# Patient Record
Sex: FEMALE | Race: WHITE | Employment: FULL TIME | URBAN - METROPOLITAN AREA
[De-identification: names, ages, dates, MRNs, and addresses within clinical notes are randomized per-mention and may not be internally consistent; named-entity substitution may affect disease eponyms.]

---

## 2017-06-28 LAB
CHLAMYDIA, EXTERNAL: NEGATIVE
HBSAG, EXTERNAL: NEGATIVE
HIV, EXTERNAL: NONREACTIVE
N. GONORRHEA, EXTERNAL: NEGATIVE
RPR, EXTERNAL: NONREACTIVE
RUBELLA, EXTERNAL: NORMAL
TYPE, ABO & RH, EXTERNAL: NORMAL

## 2018-01-08 LAB — GRBS, EXTERNAL: NEGATIVE

## 2018-02-09 ENCOUNTER — HOSPITAL ENCOUNTER (INPATIENT)
Age: 28
LOS: 1 days | Discharge: HOME | End: 2018-02-10
Attending: OBSTETRICS & GYNECOLOGY | Admitting: OBSTETRICS & GYNECOLOGY
Payer: COMMERCIAL

## 2018-02-09 PROBLEM — Z34.90 PREGNANCY: Status: ACTIVE | Noted: 2018-02-09

## 2018-02-09 LAB
ABO + RH BLD: NORMAL
BASOPHILS # BLD: 0 K/UL (ref 0–0.06)
BASOPHILS NFR BLD: 0 % (ref 0–2)
BLOOD GROUP ANTIBODIES SERPL: NORMAL
DIFFERENTIAL METHOD BLD: ABNORMAL
EOSINOPHIL # BLD: 0.1 K/UL (ref 0–0.4)
EOSINOPHIL NFR BLD: 0 % (ref 0–5)
ERYTHROCYTE [DISTWIDTH] IN BLOOD BY AUTOMATED COUNT: 13.2 % (ref 11.6–14.5)
HCT VFR BLD AUTO: 36.9 % (ref 35–45)
HGB BLD-MCNC: 12.4 G/DL (ref 12–16)
LYMPHOCYTES # BLD: 2.8 K/UL (ref 0.9–3.6)
LYMPHOCYTES NFR BLD: 19 % (ref 21–52)
MCH RBC QN AUTO: 29.5 PG (ref 24–34)
MCHC RBC AUTO-ENTMCNC: 33.6 G/DL (ref 31–37)
MCV RBC AUTO: 87.9 FL (ref 74–97)
MONOCYTES # BLD: 0.6 K/UL (ref 0.05–1.2)
MONOCYTES NFR BLD: 4 % (ref 3–10)
NEUTS SEG # BLD: 11.5 K/UL (ref 1.8–8)
NEUTS SEG NFR BLD: 77 % (ref 40–73)
PLATELET # BLD AUTO: 250 K/UL (ref 135–420)
PMV BLD AUTO: 10.6 FL (ref 9.2–11.8)
RBC # BLD AUTO: 4.2 M/UL (ref 4.2–5.3)
SPECIMEN EXP DATE BLD: NORMAL
WBC # BLD AUTO: 15.1 K/UL (ref 4.6–13.2)

## 2018-02-09 PROCEDURE — 74011250637 HC RX REV CODE- 250/637: Performed by: ADVANCED PRACTICE MIDWIFE

## 2018-02-09 PROCEDURE — 86900 BLOOD TYPING SEROLOGIC ABO: CPT | Performed by: ADVANCED PRACTICE MIDWIFE

## 2018-02-09 PROCEDURE — 75410000002 HC LABOR FEE PER 1 HR

## 2018-02-09 PROCEDURE — 75410000003 HC RECOV DEL/VAG/CSECN EA 0.5 HR

## 2018-02-09 PROCEDURE — 59025 FETAL NON-STRESS TEST: CPT

## 2018-02-09 PROCEDURE — 0HQ9XZZ REPAIR PERINEUM SKIN, EXTERNAL APPROACH: ICD-10-PCS | Performed by: ADVANCED PRACTICE MIDWIFE

## 2018-02-09 PROCEDURE — 65270000029 HC RM PRIVATE

## 2018-02-09 PROCEDURE — 85025 COMPLETE CBC W/AUTO DIFF WBC: CPT | Performed by: ADVANCED PRACTICE MIDWIFE

## 2018-02-09 PROCEDURE — 99281 EMR DPT VST MAYX REQ PHY/QHP: CPT

## 2018-02-09 PROCEDURE — 74011250636 HC RX REV CODE- 250/636: Performed by: ADVANCED PRACTICE MIDWIFE

## 2018-02-09 PROCEDURE — 75410000000 HC DELIVERY VAGINAL/SINGLE

## 2018-02-09 RX ORDER — SODIUM CHLORIDE, SODIUM LACTATE, POTASSIUM CHLORIDE, CALCIUM CHLORIDE 600; 310; 30; 20 MG/100ML; MG/100ML; MG/100ML; MG/100ML
125 INJECTION, SOLUTION INTRAVENOUS CONTINUOUS
Status: DISCONTINUED | OUTPATIENT
Start: 2018-02-09 | End: 2018-02-09 | Stop reason: HOSPADM

## 2018-02-09 RX ORDER — LIDOCAINE HYDROCHLORIDE 10 MG/ML
20 INJECTION, SOLUTION EPIDURAL; INFILTRATION; INTRACAUDAL; PERINEURAL AS NEEDED
Status: DISCONTINUED | OUTPATIENT
Start: 2018-02-09 | End: 2018-02-09 | Stop reason: HOSPADM

## 2018-02-09 RX ORDER — OXYCODONE AND ACETAMINOPHEN 5; 325 MG/1; MG/1
2 TABLET ORAL
Status: DISCONTINUED | OUTPATIENT
Start: 2018-02-09 | End: 2018-02-10 | Stop reason: HOSPADM

## 2018-02-09 RX ORDER — LIDOCAINE HYDROCHLORIDE 10 MG/ML
INJECTION INFILTRATION; PERINEURAL
Status: DISPENSED
Start: 2018-02-09 | End: 2018-02-09

## 2018-02-09 RX ORDER — IBUPROFEN 400 MG/1
800 TABLET ORAL
Status: DISCONTINUED | OUTPATIENT
Start: 2018-02-09 | End: 2018-02-10 | Stop reason: HOSPADM

## 2018-02-09 RX ORDER — AMOXICILLIN 250 MG
1 CAPSULE ORAL
Status: DISCONTINUED | OUTPATIENT
Start: 2018-02-09 | End: 2018-02-10 | Stop reason: HOSPADM

## 2018-02-09 RX ORDER — ACETAMINOPHEN 325 MG/1
650 TABLET ORAL
Status: DISCONTINUED | OUTPATIENT
Start: 2018-02-09 | End: 2018-02-10 | Stop reason: HOSPADM

## 2018-02-09 RX ORDER — TERBUTALINE SULFATE 1 MG/ML
0.25 INJECTION SUBCUTANEOUS
Status: DISCONTINUED | OUTPATIENT
Start: 2018-02-09 | End: 2018-02-09 | Stop reason: HOSPADM

## 2018-02-09 RX ORDER — OXYTOCIN/RINGER'S LACTATE 20/1000 ML
125 PLASTIC BAG, INJECTION (ML) INTRAVENOUS CONTINUOUS
Status: DISCONTINUED | OUTPATIENT
Start: 2018-02-09 | End: 2018-02-09 | Stop reason: HOSPADM

## 2018-02-09 RX ORDER — ZOLPIDEM TARTRATE 5 MG/1
5 TABLET ORAL
Status: DISCONTINUED | OUTPATIENT
Start: 2018-02-09 | End: 2018-02-10 | Stop reason: HOSPADM

## 2018-02-09 RX ORDER — MINERAL OIL
30 OIL (ML) ORAL AS NEEDED
Status: DISCONTINUED | OUTPATIENT
Start: 2018-02-09 | End: 2018-02-09 | Stop reason: HOSPADM

## 2018-02-09 RX ORDER — PROMETHAZINE HYDROCHLORIDE 25 MG/ML
25 INJECTION, SOLUTION INTRAMUSCULAR; INTRAVENOUS
Status: DISCONTINUED | OUTPATIENT
Start: 2018-02-09 | End: 2018-02-10 | Stop reason: HOSPADM

## 2018-02-09 RX ORDER — NALBUPHINE HYDROCHLORIDE 10 MG/ML
10 INJECTION, SOLUTION INTRAMUSCULAR; INTRAVENOUS; SUBCUTANEOUS
Status: DISCONTINUED | OUTPATIENT
Start: 2018-02-09 | End: 2018-02-09 | Stop reason: HOSPADM

## 2018-02-09 RX ORDER — ONDANSETRON 2 MG/ML
4 INJECTION INTRAMUSCULAR; INTRAVENOUS
Status: DISCONTINUED | OUTPATIENT
Start: 2018-02-09 | End: 2018-02-09 | Stop reason: HOSPADM

## 2018-02-09 RX ORDER — METHYLERGONOVINE MALEATE 0.2 MG/ML
0.2 INJECTION INTRAVENOUS AS NEEDED
Status: DISCONTINUED | OUTPATIENT
Start: 2018-02-09 | End: 2018-02-09 | Stop reason: HOSPADM

## 2018-02-09 RX ORDER — OXYTOCIN/RINGER'S LACTATE 20/1000 ML
500 PLASTIC BAG, INJECTION (ML) INTRAVENOUS ONCE
Status: COMPLETED | OUTPATIENT
Start: 2018-02-09 | End: 2018-02-09

## 2018-02-09 RX ORDER — HYDROMORPHONE HYDROCHLORIDE 2 MG/ML
1 INJECTION, SOLUTION INTRAMUSCULAR; INTRAVENOUS; SUBCUTANEOUS
Status: DISCONTINUED | OUTPATIENT
Start: 2018-02-09 | End: 2018-02-09 | Stop reason: HOSPADM

## 2018-02-09 RX ORDER — BUTORPHANOL TARTRATE 2 MG/ML
2 INJECTION INTRAMUSCULAR; INTRAVENOUS
Status: DISCONTINUED | OUTPATIENT
Start: 2018-02-09 | End: 2018-02-09 | Stop reason: HOSPADM

## 2018-02-09 RX ADMIN — IBUPROFEN 800 MG: 400 TABLET, FILM COATED ORAL at 16:12

## 2018-02-09 RX ADMIN — Medication 125 ML/HR: at 05:00

## 2018-02-09 RX ADMIN — Medication 10000 MILLI-UNITS/HR: at 04:26

## 2018-02-09 RX ADMIN — IBUPROFEN 800 MG: 400 TABLET, FILM COATED ORAL at 08:17

## 2018-02-09 RX ADMIN — Medication 125 ML/HR: at 06:21

## 2018-02-09 NOTE — IP AVS SNAPSHOT
Summary of Care Report The Summary of Care report has been created to help improve care coordination. Users with access to Signature or [address] (Web-based application) may access additional patient information including the Discharge Summary. If you are not currently a [address] user and need more information, please call the number listed below in the [place] [address] section and ask to be connected with Medical Records. Facility Information Name Address Phone [address] [address] [phone] Patient Information Patient Name Sex  Marie Gary ([id number]) Female 1990 Discharge Information Admitting Provider Service Area Unit Nathaniel Leong MD / [phone] 1 [address] / [phone] Discharge Provider Discharge Date/Time Discharge Disposition Destination (none) 2/10/2018 Afternoon (Pending) AHR (none) Patient Language Language ENGLISH [13] Hospital Problems as of 2/10/2018  Reviewed: 2018  5:00 AM by Lang Livingston CNM Class Noted - Resolved Last Modified POA Active Problems * (Principal)Vaginal delivery  2018 - Present 2018 by Lang Livingston CNM No  
  Entered by Lang Livingston CNM Pregnancy  2018 - Present 2018 by Nathaniel Leong MD Unknown Entered by Nathaniel Leong MD  
  
Non-Hospital Problems as of 2/10/2018  Reviewed: 2018  5:00 AM by Lang Livingston CNM None You are allergic to the following No active allergies Current Discharge Medication List  
  
START taking these medications Dose & Instructions Dispensing Information Comments  
 ibuprofen 800 mg tablet Commonly known as:  MOTRIN Dose:  800 mg Take 1 Tab by mouth every eight (8) hours as needed. Quantity:  90 Tab Refills:  0 CONTINUE these medications which have NOT CHANGED Dose & Instructions Dispensing Information Comments PRENATAL DHA+COMPLETE PRENATAL -300 mg-mcg-mg Cmpk Generic drug:  [id number] chapito-folic-dha  
 Dose:  1 Tab Take 1 Tab by mouth daily. Indications: pregnancy Refills:  0 Follow-up Information Follow up With Details Comments Contact Info None   None (395) Patient stated that they have no PCP Discharge Instructions After Your Delivery (the Postpartum Period): After Your Visit Your Care Instructions Congratulations on the birth of your baby. Like pregnancy, the  period can be a time of excitement, ludy, and exhaustion. You may look at your wondrous little baby and feel happy. You may also be overwhelmed by your new sleep hours and new responsibilities. At first, babies often sleep during the days and are awake at night. They do not have a pattern or routine. They may make sudden gasps, jerk themselves awake, or look like they have crossed eyes. These are all normal, and they may even make you smile. In these first weeks after delivery, try to take good care of yourself. It may take 4 to 6 weeks to feel like yourself again, and possibly longer if you had a  birth. You will likely feel very tired for several weeks. Your days will be full of ups and downs, but lots of ludy as well. Follow-up care is a key part of your treatment and safety. Be sure to make and go to all appointments, and call your doctor if you are having problems. Its also a good idea to know your test results and keep a list of the medicines you take. How can you care for yourself at home? Take care of your body after delivery · Use pads instead of tampons for the bloody flow that may last as long as 2 weeks. · Ease cramps with acetaminophen (Tylenol).  
· Ease soreness of hemorrhoids and the area between your vagina and rectum with ice compresses or witch hazel pads. · Ease constipation by drinking lots of fluid and eating high-fiber foods. Ask your doctor about over-the-counter stool softeners. · Cleanse yourself with a gentle squeeze of warm water from a bottle instead of wiping with toilet paper. · Take a sitz bath in warm water several times a day. · Wear a good nursing bra. Ease sore and swollen breasts with warm, wet washcloths. · If you are not breast-feeding, use ice rather than heat for breast soreness. · Your period may not start for several months if you are breast-feeding. You may bleed more, and longer at first, than you did before you got pregnant. · Wait until you are healed (about 4 to 6 weeks) before you have sexual intercourse. Your doctor will tell you when it is okay to have sex. · Try not to travel with your baby for 5 or 6 weeks. If you take a long car trip, make frequent stops to walk around and stretch. Avoid exhaustion · Rest every day. Try to nap when your baby naps. · Ask another adult to be with you for a few days after delivery. · Plan for  if you have other children. · Stay flexible so you can eat at odd hours and sleep when you need to. Both you and your baby are making new schedules. · Plan small trips to get out of the house. Change can make you feel less tired. · Ask for help with housework, cooking, and shopping. Remind yourself that your job is to care for your baby. Know about help for postpartum depression · \"Baby blues are common for the first 1 to 2 weeks after birth. You may cry or feel sad or irritable for no reason. · Rest whenever you can. Being tired makes it harder to handle your emotions. · Go for walks with your baby. · Talk to your partner, friends, and family about your feelings. · If your symptoms last for more than a few weeks, or if you feel very depressed, ask your doctor for help. · Postpartum depression can be treated. Support groups and counseling can help. Sometimes medicine can also help. Stay healthy · Eat healthy foods so you have more energy, make good breast milk, and lose extra baby pounds. · If you breast-feed, avoid alcohol and drugs. Stay smoke-free. If you quit during pregnancy, congratulations. · Start daily exercise after 4 to 6 weeks, but rest when you feel tired. · Learn exercises to tone your belly. Do Kegel exercises to regain strength in your pelvic muscles. You can do these exercises while you stand or sit. ¨ Squeeze the same muscles you would use to stop your urine. Your belly and rear end (buttocks) should not move. ¨ Hold the squeeze for 3 seconds, then relax for 3 seconds. ¨ Repeat the exercise 10 to 15 times for each session. Do three or more sessions each day. · Find a class for new mothers and new babies that has an exercise time. · If you had a  birth, give yourself a bit more time before you exercise, and be careful. When should you call for help? Call 911 anytime you think you may need emergency care. For example, call if: 
· You have sudden, severe pain in your belly. · You passed out (lost consciousness). Call your doctor now or seek immediate medical care if: 
· You have severe vaginal bleeding. You are passing blood clots and soaking through a pad each hour for 2 or more hours. · Your vaginal bleeding seems to be getting heavier or is still bright red 4 days after delivery, or you pass blood clots larger than the size of a golf ball. · You are dizzy or lightheaded, or you feel like you may faint. · You are vomiting or cannot keep fluids down. · You have a fever. · You have new or more belly pain. · You pass tissue (not just blood). · Your breast or breasts have hard, red, or tender areas. · You have an urgent or frequent need to urinate, along with a burning feeling. · You have severe pain, tenderness, or swelling in your vagina or the area between your rectum and vagina. · You have severe pains in your chest, belly, back, or legs. · You have feelings of severe despair or great anxiety. · Your baby is unusually cranky or is sleeping too much. · Your baby's eyes are red or have discharge. · Your baby has white patches on the roof and sides of the mouth or tongue. · Your baby's umbilical cord is foul-smelling, swollen, red, or leaking pus. · There is blood or mucus in your baby's bowel movements. · Your baby has fewer than 6 wet diapers a day. · Your baby does not want to eat, or your baby is throwing up with every feeding. · Your baby has trouble breathing. · Your baby has a rectal temperature of 100.4°F or more, or an underarm temperature over 99.4°F. 
· You baby has a low temperature less than 97.5°F rectal, or less than 97. 0°F underarm. · Your baby's skin looks yellow. Watch closely for changes in your health, and be sure to contact your doctor if you have any problems. Where can you learn more? Go to [employer] Enter A461 in the search box to learn more about \"After Your Delivery (the Postpartum Period): After Your Visit. \"  
© 0564-7319 Healthwise, Incorporated. Care instructions adapted under license by [employer] (which disclaims liability or warranty for this information). This care instruction is for use with your licensed healthcare professional. If you have questions about a medical condition or this instruction, always ask your healthcare professional. [address] any warranty or liability for your use of this information. Content Version: 9.2.926242; Last Revised: July 8, 2010 [employer] Activation Thank you for requesting access to [employer]. Please follow the instructions below to securely access and download your online medical record.  [employer] allows you to send messages to your doctor, view your test results, renew your prescriptions, schedule appointments, and more. How Do I Sign Up? 1. In your internet browser, go to [email or web address] 
2. Click on the First Time User? Click Here link in the Sign In box. You will be redirect to the New Member Sign Up page. 3. Enter your Wander Access Code exactly as it appears below. You will not need to use this code after youve completed the sign-up process. If you do not sign up before the expiration date, you must request a new code. Wander Access Code: [email or web address] Expires: 2018  3:14 PM (This is the date your Wander access code will ) 4. Enter the last four digits of your Social Security Number (xxxx) and Date of Birth (mm/dd/yyyy) as indicated and click Submit. You will be taken to the next sign-up page. 5. Create a Wander ID. This will be your Wander login ID and cannot be changed, so think of one that is secure and easy to remember. 6. Create a Wander password. You can change your password at any time. 7. Enter your Password Reset Question and Answer. This can be used at a later time if you forget your password. 8. Enter your e-mail address. You will receive e-mail notification when new information is available in [address] 9. Click Sign Up. You can now view and download portions of your medical record. 10. Click the Download Summary menu link to download a portable copy of your medical information. Additional Information Remember, [employer]ander is NOT to be used for urgent needs. For medical emergencies, dial 911. Patient armband removed and shredded Gynecology Discharge Summary Name: Cynthia Vela MRN: [id number]  SSN: [id number] YOB: 1990  Age: 28 y.o. Sex: female Admit Date: 2018 Discharge Date: 2/10/2018 Admitting Physician: Amena Lam MD  
 
Discharge Physician: Jesús Crump RN  
 
 Admission Diagnoses: labor Pregnancy Discharge Diagnoses:  
Problem List as of 2/10/2018  Date Reviewed: 2/9/2018 Codes Class Noted - Resolved * (Principal)Vaginal delivery ICD-10-CM: O80 
ICD-9-CM: 211  2/9/2018 - Present Pregnancy ICD-10-CM: Z34.90 ICD-9-CM: V22.2  2/9/2018 - Present Activity: No sex, douching, or tampons for 6 weeks or as directed by your physician. No heavy lifting for 6 weeks. No driving while taking pain medication. Diet: Resume pre-hospital diet Follow-up Appointments Procedures  FOLLOW UP VISIT Appointment in: 6 Weeks Standing Status:   Standing Number of Occurrences:   1 Order Specific Question:   Appointment in Answer:   6 Weeks Signed By:  Freedom Richards RN February 10, 2018 Recognize signs and symptoms of STROKE: 
 
F - Face looks uneven. A - Arms unable to move or move evenly. S - Speech slurred or non existent. T - Time - call 911 as soon as signs and symptoms begin - do not go back to bed or wait to see if you get better - TIME IS BRAIN. Chart Review Routing History No Routing History on File

## 2018-02-09 NOTE — PROGRESS NOTES
INITIAL NUTRITION ASSESSMENT     REASON FOR ASSESSMENT:   [x]  RN Referral:       [x] Pregnant (Not in Labor):  [] Gestational DM     [x] Multigestation       NUTRITION ASSESSMENT:   Client History: 28 yrs old Female admitted with single pregnancy, not multigestational pregnancy. Nutrition best practice alert triggered in error. Please consult if dietitian services are indicated and desired.  Thank you.    [] Participated in Interdisciplinary Rounds  [x] [facility] Reviewed/Documented  [] Discharge Nutrition Plan     Rachel Rosales, [address] [place] [address]  PAGER:  [phone]

## 2018-02-09 NOTE — H&P
History & Physical    Name: Kim Shields MRN: [id number]  SSN: [id number]    YOB: 1990  Age: 28 y.o. Sex: female      Subjective:     Estimated Date of Delivery: None noted. OB History    Para Term  AB Living   1        SAB TAB Ectopic Molar Multiple Live Births              # Outcome Date GA Lbr Cristopher/2nd Weight Sex Delivery Anes PTL Lv   1 Current                   Ms. Bere Green is a  admitted with pregnancy at [address] [place]  For labor and delivery. . Prenatal course was normal.  Please see prenatal records for details. No past medical history on file. No past surgical history on file. Social History     Occupational History    Not on file. Social History Main Topics    Smoking status: Not on file    Smokeless tobacco: Not on file    Alcohol use Not on file    Drug use: Not on file    Sexual activity: Not on file     No family history on file. Allergies not on file  Prior to Admission medications    Not on File        Review of Systems: A comprehensive review of systems was negative except for that written in the History of Present Illness. Objective:     Vitals: There were no vitals filed for this visit. Physical Exam:  Patient without distress. Heart: Regular rate and rhythm, S1S2 present or without murmur or extra heart sounds  Lung: clear to auscultation throughout lung fields, no wheezes, no rales, no rhonchi and normal respiratory effort  Abdomen: soft, nontender  Fundus: Firm at umbilicus with bleeding well controlled. Prenatal Labs:   No results found for: ABORH, RUBELLAEXT, HBSAGEXT, HIVEXT, RPREXT, GONNOEXT, CHLAMEXT, ABORHEXT, RUBELLAEXT, GRBSEXT, HBSAGEXT, HIVEXT, RPREXT, GONNOEXT, CHLAMEXT    Impression/Plan:     Active Problems:    * No active hospital problems. *       Plan: Admit for labor and delivery and postpartum care. . Group B Strep negative.     Signed By:  Chuckie Barragan CNM     2018

## 2018-02-09 NOTE — PROGRESS NOTES
Pt arrived to L&D with complaints of contractions every 2 minutes and possible SROM. Pt is a  40.4wks. Pt states her contractions started at 11:30 last night and have gotten stronger and closer together throughout the night. Pt denies any vaginal bleeding or discharge. Pt reports positive fetal movement. Pt taken to BR 6 and given gown to change into. 6757: EFM monitor applied    0357: Nitrazine positive. SVE: /0    0402: CAMELIA Negrete Challenger paged through [address]: CAMELIA Negrete Challenger paged back promptly. Informed CNM of pt arrival and complaint of contractions. Pt is a  40.4wks. Pt is mitzy every 2 minutes. SVE: /0. CNM stated to admit pt and she is on her way in for delivery. 0410: PIV placed to L hand. LR infusing. 3429: CAMELIA Negrete Challenger at bedside for delivery. SVE: 10/100/+1    0420: Pt pushing    0426:  of viable baby girl at this time. Baby placed skin to skin on mothers abdomen. Tactile stimulation and bulb suction performed. 0444:  of placenta. Pitocin infusing    0455: Pericare performed. Pad and ice pack placed on perineum. 0500: Pt sitting up breastfeeding baby at this time. 0505: Head to toe assessment performed    0715: Bedside and verbal report given to HANSA Cole RN via SBAR, Kardex, and MAR. Care relinquished at this time.

## 2018-02-09 NOTE — PROGRESS NOTES
1740 TRANSFER - OUT REPORT:    Verbal report given to Az Schmidt RN(name) on Jennifer Stockton  being transferred to Mother Baby Room 246 (unit) for routine progression of care       Report consisted of patients Situation, Background, Assessment and   Recommendations(SBAR). Information from the following report(s) SBAR, Kardex, Intake/Output, MAR and Recent Results was reviewed with the receiving nurse. Lines:   Peripheral IV 02/09/18 Left Hand (Active)   Site Assessment Clean, dry, & intact 2/9/2018  4:10 PM   Phlebitis Assessment 0 2/9/2018  4:10 PM   Infiltration Assessment 0 2/9/2018  4:10 PM   Dressing Status Clean, dry, & intact 2/9/2018  4:10 PM   Dressing Type Tape;Transparent 2/9/2018  4:10 PM   Hub Color/Line Status Pink;Capped 2/9/2018  4:10 PM   Action Taken Open ports on tubing capped 2/9/2018  4:10 PM   Alcohol Cap Used Yes 2/9/2018  4:10 PM        Opportunity for questions and clarification was provided.       Patient transported with:   Registered Nurse

## 2018-02-09 NOTE — IP AVS SNAPSHOT
Raul Ross 
 
 
 [address] [facility] [address] 
[phone] Patient: Cynthia Vela MRN: [id number] MB A check janusz indicates which time of day the medication should be taken. My Medications START taking these medications Instructions Each Dose to Equal  
 Morning Noon Evening Bedtime  
 ibuprofen 800 mg tablet Commonly known as:  MOTRIN Your last dose was: Your next dose is: Take 1 Tab by mouth every eight (8) hours as needed. 800 mg CONTINUE taking these medications Instructions Each Dose to Equal  
 Morning Noon Evening Bedtime PRENATAL DHA+COMPLETE PRENATAL 300 mg-mcg-mg Cmpk Generic drug:  [id number] cahpito-folic-dha Your last dose was: Your next dose is: Take 1 Tab by mouth daily. Indications: pregnancy 1 Tab Where to Get Your Medications Information on where to get these meds will be given to you by the nurse or doctor. ! Ask your nurse or doctor about these medications  
  ibuprofen 800 mg tablet

## 2018-02-09 NOTE — L&D DELIVERY NOTE
Patient: Gil Phlegm                   Delivery Summary    Circumcision:   NA-female    Information for the patient's :  Hilary Rodriguez [[id number]]     Delivery Type:     Delivery Date: 2018   Delivery Time: 4:26 AM     Birth Weight: 3.314 kg     Sex:  female  Delivery Clinician:  Sixto Arroyo   Gestational Age: Gestational Age: <None>    Presentation: Vertex   Position: Left  Occiput  Anterior     Apgars were   and       Resuscitation Method: Tactile Stimulation;Suctioning-bulb     Meconium Stained: None  Living Status: Living       Placenta Date/Time:     Placenta Removal: Spontaneous   Placenta Appearance: Vertex [1]     Cord Information: 3 Vessels    Cord Events: Nuchal Cord Without Compressions; None       Cord Blood Sent?:  Yes    Blood Gases Sent?:  No       Cord pH:  none    Episiotomy:   Laceration(s): First degree perineal    Estimated Blood Loss (ml): 150    Labor Events  Method:      Augmentation:   Cervical Ripening:        Induction - no    Induction Indication - N/A    Induction Method - N/A    Complications - none    NICU Admit - no    HTN Disorders - none    Diabetes - N/A    Operative Vaginal Delivery - none    Additional Delivery Comments - Summoned to room for patient 9 cm and feeling urge to push. With strong maternal effort, infant head delivered DEEPTHI, restituted, and followed by easy delivery of right anterior shoulder. Vigorous, female infant placed immediately on maternal abdomen, was dried, stimulated, and bulb suctioned. Once cord stopped pulsating, cord was double clamped and FOB cut cord in between clamps. Cord blood collected. Placenta delivered spontaneously, intact juan jose with 3 vessel cord. Fundus firm and 1 below umbilicus with bleeding well controlled. Postpartum IV pitocin bolus initiated. 1st degree perineal laceration noted and was hemostatic and not in need of repair. . Maternal and infant VSS. Will begin routine  and postpartum care.

## 2018-02-10 VITALS
SYSTOLIC BLOOD PRESSURE: 100 MMHG | WEIGHT: 215 LBS | HEIGHT: 69 IN | HEART RATE: 56 BPM | TEMPERATURE: 98.4 F | RESPIRATION RATE: 18 BRPM | OXYGEN SATURATION: 99 % | DIASTOLIC BLOOD PRESSURE: 63 MMHG | BODY MASS INDEX: 31.84 KG/M2

## 2018-02-10 LAB
HCT VFR BLD AUTO: 33.7 % (ref 35–45)
HGB BLD-MCNC: 11.1 G/DL (ref 12–16)

## 2018-02-10 PROCEDURE — 85018 HEMOGLOBIN: CPT | Performed by: ADVANCED PRACTICE MIDWIFE

## 2018-02-10 PROCEDURE — 36415 COLL VENOUS BLD VENIPUNCTURE: CPT | Performed by: ADVANCED PRACTICE MIDWIFE

## 2018-02-10 PROCEDURE — 74011250637 HC RX REV CODE- 250/637: Performed by: ADVANCED PRACTICE MIDWIFE

## 2018-02-10 PROCEDURE — 85014 HEMATOCRIT: CPT | Performed by: ADVANCED PRACTICE MIDWIFE

## 2018-02-10 RX ORDER — IBUPROFEN 800 MG/1
800 TABLET ORAL
Qty: 90 TAB | Refills: 0 | Status: SHIPPED | OUTPATIENT
Start: 2018-02-10 | End: 2019-11-13

## 2018-02-10 RX ADMIN — IBUPROFEN 800 MG: 400 TABLET, FILM COATED ORAL at 12:19

## 2018-02-10 RX ADMIN — IBUPROFEN 800 MG: 400 TABLET, FILM COATED ORAL at 00:19

## 2018-02-10 NOTE — DISCHARGE SUMMARY
Obstetrical Discharge Summary     Name: Doyle Lopez MRN: [id number]  SSN: [id number]    YOB: 1990  Age: 28 y.o. Sex: female      Admit Date: 2018    Discharge Date: 2/10/2018    Admitting Physician: Britany Muñoz MD     Attending Physician:  Britany Muñoz MD     Discharge Diagnoses:   Information for the patient's :  Paddy Levine [[id number]]   Delivery of a 3.314 kg female infant via Vaginal, Spontaneous Delivery on 2018 at 4:26 AM  by . Apgars were 9 and 9. Additional Diagnoses:   Problem List as of 2/10/2018  Date Reviewed: 2018          Codes Class Noted - Resolved    * (Principal)Vaginal delivery ICD-10-CM: O80  ICD-9-CM: 650  2018 - Present        Pregnancy ICD-10-CM: Z34.90  ICD-9-CM: V22.2  2018 - Present              Lab Results   Component Value Date/Time    Rubella, External positive (immune) 2017    GrBStrep, External negative 2018     Recent Labs      02/10/18   0525   [facility] course following the delivery. Patient Instructions:   Current Discharge Medication List      START taking these medications    Details   ibuprofen (MOTRIN) 800 mg tablet Take 1 Tab by mouth every eight (8) hours as needed. Qty: 90 Tab, Refills: 0         CONTINUE these medications which have NOT CHANGED    Details   [id number] chapito-folic-dha (PRENATAL DHA+COMPLETE PRENATAL) -300 mg-mcg-mg cmpk Take 1 Tab by mouth daily. Indications: pregnancy             Reference my discharge instructions.     Follow-up Appointments   Procedures    FOLLOW UP VISIT Appointment in: 6 Weeks     Standing Status:   Standing     Number of Occurrences:   1     Order Specific Question:   Appointment in     Answer:   6 Weeks        Signed By:  Ozzy Dia CNM     February 10, 2018                       BST

## 2018-02-10 NOTE — PROGRESS NOTES
2300 Bedside and Verbal shift change report given to MIGUEL ANGEL Nelson RN  (oncoming nurse) by Eleanor RN  (offgoing nurse). Report included the following information SBAR, Procedure Summary, Intake/Output, MAR and Recent Results. 2801 Bedside and Verbal shift change report given to ATUL Briseno RN (oncoming nurse) by Laura Nelson RN (offgoing nurse). Report included the following information SBAR, Procedure Summary, Intake/Output, MAR and Recent Results.

## 2018-02-10 NOTE — LACTATION NOTE
Still needs nipple shield. Mom denies any c/o. Encouraged to attend [facility] BF support group.

## 2018-02-10 NOTE — PROGRESS NOTES
Discharge teaching done with FOB also present. Opportunity for questions given and parents verbalized understanding.

## 2018-02-10 NOTE — DISCHARGE INSTRUCTIONS
After Your Delivery (the Postpartum Period): After Your Visit  Your Care Instructions  Congratulations on the birth of your baby. Like pregnancy, the  period can be a time of excitement, ludy, and exhaustion. You may look at your wondrous little baby and feel happy. You may also be overwhelmed by your new sleep hours and new responsibilities. At first, babies often sleep during the days and are awake at night. They do not have a pattern or routine. They may make sudden gasps, jerk themselves awake, or look like they have crossed eyes. These are all normal, and they may even make you smile. In these first weeks after delivery, try to take good care of yourself. It may take 4 to 6 weeks to feel like yourself again, and possibly longer if you had a  birth. You will likely feel very tired for several weeks. Your days will be full of ups and downs, but lots of ludy as well. Follow-up care is a key part of your treatment and safety. Be sure to make and go to all appointments, and call your doctor if you are having problems. Its also a good idea to know your test results and keep a list of the medicines you take. How can you care for yourself at home? Take care of your body after delivery  · Use pads instead of tampons for the bloody flow that may last as long as 2 weeks. · Ease cramps with acetaminophen (Tylenol). · Ease soreness of hemorrhoids and the area between your vagina and rectum with ice compresses or witch hazel pads. · Ease constipation by drinking lots of fluid and eating high-fiber foods. Ask your doctor about over-the-counter stool softeners. · Cleanse yourself with a gentle squeeze of warm water from a bottle instead of wiping with toilet paper. · Take a sitz bath in warm water several times a day. · Wear a good nursing bra. Ease sore and swollen breasts with warm, wet washcloths. · If you are not breast-feeding, use ice rather than heat for breast soreness.   · Your period may not start for several months if you are breast-feeding. You may bleed more, and longer at first, than you did before you got pregnant. · Wait until you are healed (about 4 to 6 weeks) before you have sexual intercourse. Your doctor will tell you when it is okay to have sex. · Try not to travel with your baby for 5 or 6 weeks. If you take a long car trip, make frequent stops to walk around and stretch. Avoid exhaustion  · Rest every day. Try to nap when your baby naps. · Ask another adult to be with you for a few days after delivery. · Plan for  if you have other children. · Stay flexible so you can eat at odd hours and sleep when you need to. Both you and your baby are making new schedules. · Plan small trips to get out of the house. Change can make you feel less tired. · Ask for help with housework, cooking, and shopping. Remind yourself that your job is to care for your baby. Know about help for postpartum depression  · \"Baby blues are common for the first 1 to 2 weeks after birth. You may cry or feel sad or irritable for no reason. · Rest whenever you can. Being tired makes it harder to handle your emotions. · Go for walks with your baby. · Talk to your partner, friends, and family about your feelings. · If your symptoms last for more than a few weeks, or if you feel very depressed, ask your doctor for help. · Postpartum depression can be treated. Support groups and counseling can help. Sometimes medicine can also help. Stay healthy  · Eat healthy foods so you have more energy, make good breast milk, and lose extra baby pounds. · If you breast-feed, avoid alcohol and drugs. Stay smoke-free. If you quit during pregnancy, congratulations. · Start daily exercise after 4 to 6 weeks, but rest when you feel tired. · Learn exercises to tone your belly. Do Kegel exercises to regain strength in your pelvic muscles. You can do these exercises while you stand or sit.   ¨ Squeeze the same muscles you would use to stop your urine. Your belly and rear end (buttocks) should not move. ¨ Hold the squeeze for 3 seconds, then relax for 3 seconds. ¨ Repeat the exercise 10 to 15 times for each session. Do three or more sessions each day. · Find a class for new mothers and new babies that has an exercise time. · If you had a  birth, give yourself a bit more time before you exercise, and be careful. When should you call for help? Call 911 anytime you think you may need emergency care. For example, call if:  · You have sudden, severe pain in your belly. · You passed out (lost consciousness). Call your doctor now or seek immediate medical care if:  · You have severe vaginal bleeding. You are passing blood clots and soaking through a pad each hour for 2 or more hours. · Your vaginal bleeding seems to be getting heavier or is still bright red 4 days after delivery, or you pass blood clots larger than the size of a golf ball. · You are dizzy or lightheaded, or you feel like you may faint. · You are vomiting or cannot keep fluids down. · You have a fever. · You have new or more belly pain. · You pass tissue (not just blood). · Your breast or breasts have hard, red, or tender areas. · You have an urgent or frequent need to urinate, along with a burning feeling. · You have severe pain, tenderness, or swelling in your vagina or the area between your rectum and vagina. · You have severe pains in your chest, belly, back, or legs. · You have feelings of severe despair or great anxiety. · Your baby is unusually cranky or is sleeping too much. · Your baby's eyes are red or have discharge. · Your baby has white patches on the roof and sides of the mouth or tongue. · Your baby's umbilical cord is foul-smelling, swollen, red, or leaking pus. · There is blood or mucus in your baby's bowel movements. · Your baby has fewer than 6 wet diapers a day.   · Your baby does not want to eat, or your baby is throwing up with every feeding. · Your baby has trouble breathing. · Your baby has a rectal temperature of 100.4°F or more, or an underarm temperature over 99.4°F.  · You baby has a low temperature less than 97.5°F rectal, or less than 97. 0°F underarm. · Your baby's skin looks yellow. Watch closely for changes in your health, and be sure to contact your doctor if you have any problems. Where can you learn more? Go to [employer]  Enter A461 in the search box to learn more about \"After Your Delivery (the Postpartum Period): After Your Visit. \"   © 4628-0635 Healthwise, Incorporated. Care instructions adapted under license by [facility] (which disclaims liability or warranty for this information). This care instruction is for use with your licensed healthcare professional. If you have questions about a medical condition or this instruction, always ask your healthcare professional. [place] [address] any warranty or liability for your use of this information. Content Version: 9.2.267265; Last Revised: 2010      [employer] Activation    Thank you for requesting access to [employer]. Please follow the instructions below to securely access and download your online medical record. [employer] allows you to send messages to your doctor, view your test results, renew your prescriptions, schedule appointments, and more. How Do I Sign Up? 1. In your internet browser, go to [email or web address]  2. Click on the First Time User? Click Here link in the Sign In box. You will be redirect to the New Member Sign Up page. 3. Enter your [employer] Access Code exactly as it appears below. You will not need to use this code after youve completed the sign-up process. If you do not sign up before the expiration date, you must request a new code. [employer] Access Code: [email or web address]  Expires: 2018  3:14 PM (This is the date your [employer] access code will )    4.  Enter the last four digits of your Social Security Number (xxxx) and Date of Birth (mm/dd/yyyy) as indicated and click Submit. You will be taken to the next sign-up page. 5. Create a [employer] ID. This will be your [employer] login ID and cannot be changed, so think of one that is secure and easy to remember. 6. Create a [employer] password. You can change your password at any time. 7. Enter your Password Reset Question and Answer. This can be used at a later time if you forget your password. 8. Enter your e-mail address. You will receive e-mail notification when new information is available in [address] 9. Click Sign Up. You can now view and download portions of your medical record. 10. Click the Download Summary menu link to download a portable copy of your medical information. Additional Information    Remember, [employer] is NOT to be used for urgent needs. For medical emergencies, dial 911. Patient armband removed and shredded    Gynecology Discharge Summary     Name: Blair Hermosillo MRN: [id number]  SSN: [id number]    YOB: 1990  Age: 28 y.o. Sex: female      Admit Date: 2/9/2018    Discharge Date: 2/10/2018      Admitting Physician: Maude Malcolm MD     Discharge Physician: Rhys Muir RN     Admission Diagnoses: labor  Pregnancy    Discharge Diagnoses:   Problem List as of 2/10/2018  Date Reviewed: 2/9/2018          Codes Class Noted - Resolved    * (Principal)Vaginal delivery ICD-10-CM: O80  ICD-9-CM: 650  2/9/2018 - Present        Pregnancy ICD-10-CM: Z34.90  ICD-9-CM: V22.2  2/9/2018 - Present                      Activity: No sex, douching, or tampons for 6 weeks or as directed by your physician. No heavy lifting for 6 weeks. No driving while taking pain medication.   Diet: Resume pre-hospital diet      Follow-up Appointments   Procedures    FOLLOW UP VISIT Appointment in: 6 Weeks     Standing Status:   Standing     Number of Occurrences:   1     Order Specific Question:   Appointment in Answer:   6 Weeks       Signed By:  Georgia Hogan RN     February 10, 2018       Recognize signs and symptoms of STROKE:    F - Face looks uneven. A - Arms unable to move or move evenly. S - Speech slurred or non existent. T - Time - call 911 as soon as signs and symptoms begin - do not go back to bed or wait to see if you get better - TIME IS BRAIN.

## 2018-02-10 NOTE — PROGRESS NOTES
Post-Partum Day Number 1 Progress Note    Eduard Lucas     Assessment:   Hospital Problems  Date Reviewed: 2018          Codes Class Noted POA    * (Principal)Vaginal delivery ICD-10-CM: O80  ICD-9-CM: 432  2018 No        Pregnancy ICD-10-CM: Z34.90  ICD-9-CM: V22.2  2018 Unknown            Doing well, post partum day 1    Plan:  1. Continue routine postpartum and perineal care as well as maternal education. 2. Desires discharge home today pending peds approval   3. Follow up in 6 weeks     Information for the patient's :  Alvaro Fish [[id number]]   Vaginal, Spontaneous Delivery   Patient doing well without significant complaint. Voiding without difficulty, normal lochia. Pain well controlled. Breastfeeding without concerns. Current Facility-Administered Medications   Medication Dose Route Frequency       Vitals:  Visit Vitals    /63 (BP 1 Location: Left arm, BP Patient Position: At rest)    Pulse (!) 56    Temp 98.4 °F (36.9 °C)    Resp 18    Ht 5' 9\" (1.753 m)    Wt 97.5 kg (215 lb)    SpO2 99%    Breastfeeding Yes    BMI 31.75 kg/m2     Temp (24hrs), Av.1 °F (36.7 °C), Min:98 °F (36.7 °C), Max:98.4 °F (36.9 °C)        Exam:   Patient without distress. Abdomen soft, fundus firm, nontender                Perineum with normal lochia noted. Lower extremities are negative for swelling, cords or tenderness.     Labs:     Lab Results   Component Value Date/Time    WBC 15.1 (H) 2018 04:10 AM    HGB 11.1 (L) 02/10/2018 05:25 AM    HGB 12.4 2018 04:10 AM    HCT 33.7 (L) 02/10/2018 05:25 AM    HCT 36.9 2018 04:10 AM    PLATELET 872  04:10 AM       Recent Results (from the past 24 hour(s))   HEMOGLOBIN    Collection Time: 02/10/18  5:25 AM   Result Value Ref Range    HGB 11.1 (L) 12.0 - 16.0 g/dL   HEMATOCRIT    Collection Time: 02/10/18  5:25 AM   Result Value Ref Range    HCT 33.7 (L) 35.0 - 45.0 %

## 2018-02-10 NOTE — PROGRESS NOTES
Bedside and Verbal shift change report given to Bridgett Partida RN (oncoming nurse) by DREW Fuchs RN (offgoing nurse). Report included the following information SBAR, Kardex and Recent Results.

## 2019-11-13 ENCOUNTER — APPOINTMENT (OUTPATIENT)
Dept: ULTRASOUND IMAGING | Age: 29
End: 2019-11-13
Attending: EMERGENCY MEDICINE
Payer: COMMERCIAL

## 2019-11-13 ENCOUNTER — HOSPITAL ENCOUNTER (EMERGENCY)
Age: 29
Discharge: HOME | End: 2019-11-13
Attending: EMERGENCY MEDICINE
Payer: COMMERCIAL

## 2019-11-13 VITALS
WEIGHT: 195 LBS | HEIGHT: 69 IN | RESPIRATION RATE: 18 BRPM | SYSTOLIC BLOOD PRESSURE: 106 MMHG | OXYGEN SATURATION: 99 % | TEMPERATURE: 97.9 F | BODY MASS INDEX: 28.88 KG/M2 | DIASTOLIC BLOOD PRESSURE: 65 MMHG | HEART RATE: 73 BPM

## 2019-11-13 DIAGNOSIS — R10.32 ABDOMINAL PAIN, LLQ (LEFT LOWER QUADRANT): ICD-10-CM

## 2019-11-13 DIAGNOSIS — N83.202 CYST OF LEFT OVARY: Primary | ICD-10-CM

## 2019-11-13 LAB
ALBUMIN SERPL-MCNC: 4 G/DL (ref 3.4–5)
ALBUMIN/GLOB SERPL: 1.1 {RATIO} (ref 0.8–1.7)
ALP SERPL-CCNC: 55 U/L (ref 45–117)
ALT SERPL-CCNC: 21 U/L (ref 13–56)
ANION GAP SERPL CALC-SCNC: 8 MMOL/L (ref 3–18)
APPEARANCE UR: CLEAR
AST SERPL-CCNC: 15 U/L (ref 10–38)
BASOPHILS # BLD: 0 K/UL (ref 0–0.1)
BASOPHILS NFR BLD: 0 % (ref 0–2)
BILIRUB SERPL-MCNC: 0.4 MG/DL (ref 0.2–1)
BILIRUB UR QL: NEGATIVE
BUN SERPL-MCNC: 8 MG/DL (ref 7–18)
BUN/CREAT SERPL: 14 (ref 12–20)
CALCIUM SERPL-MCNC: 8.7 MG/DL (ref 8.5–10.1)
CHLORIDE SERPL-SCNC: 106 MMOL/L (ref 100–111)
CO2 SERPL-SCNC: 26 MMOL/L (ref 21–32)
COLOR UR: YELLOW
CREAT SERPL-MCNC: 0.59 MG/DL (ref 0.6–1.3)
DIFFERENTIAL METHOD BLD: ABNORMAL
EOSINOPHIL # BLD: 0.1 K/UL (ref 0–0.4)
EOSINOPHIL NFR BLD: 1 % (ref 0–5)
ERYTHROCYTE [DISTWIDTH] IN BLOOD BY AUTOMATED COUNT: 13.2 % (ref 11.6–14.5)
GLOBULIN SER CALC-MCNC: 3.5 G/DL (ref 2–4)
GLUCOSE SERPL-MCNC: 89 MG/DL (ref 74–99)
GLUCOSE UR STRIP.AUTO-MCNC: NEGATIVE MG/DL
HCG UR QL: NEGATIVE
HCT VFR BLD AUTO: 37.7 % (ref 35–45)
HGB BLD-MCNC: 12.6 G/DL (ref 12–16)
HGB UR QL STRIP: NEGATIVE
KETONES UR QL STRIP.AUTO: NEGATIVE MG/DL
LEUKOCYTE ESTERASE UR QL STRIP.AUTO: NEGATIVE
LYMPHOCYTES # BLD: 2.3 K/UL (ref 0.9–3.6)
LYMPHOCYTES NFR BLD: 25 % (ref 21–52)
MCH RBC QN AUTO: 30.1 PG (ref 24–34)
MCHC RBC AUTO-ENTMCNC: 33.4 G/DL (ref 31–37)
MCV RBC AUTO: 90 FL (ref 74–97)
MONOCYTES # BLD: 0.6 K/UL (ref 0.05–1.2)
MONOCYTES NFR BLD: 6 % (ref 3–10)
NEUTS SEG # BLD: 6.2 K/UL (ref 1.8–8)
NEUTS SEG NFR BLD: 68 % (ref 40–73)
NITRITE UR QL STRIP.AUTO: NEGATIVE
PH UR STRIP: 6.5 [PH] (ref 5–8)
PLATELET # BLD AUTO: 300 K/UL (ref 135–420)
PMV BLD AUTO: 9.7 FL (ref 9.2–11.8)
POTASSIUM SERPL-SCNC: 3.6 MMOL/L (ref 3.5–5.5)
PROT SERPL-MCNC: 7.5 G/DL (ref 6.4–8.2)
PROT UR STRIP-MCNC: NEGATIVE MG/DL
RBC # BLD AUTO: 4.19 M/UL (ref 4.2–5.3)
SODIUM SERPL-SCNC: 140 MMOL/L (ref 136–145)
SP GR UR REFRACTOMETRY: 1.03 (ref 1–1.03)
UROBILINOGEN UR QL STRIP.AUTO: 0.2 EU/DL (ref 0.2–1)
WBC # BLD AUTO: 9.2 K/UL (ref 4.6–13.2)

## 2019-11-13 PROCEDURE — 81025 URINE PREGNANCY TEST: CPT

## 2019-11-13 PROCEDURE — 96374 THER/PROPH/DIAG INJ IV PUSH: CPT

## 2019-11-13 PROCEDURE — 80053 COMPREHEN METABOLIC PANEL: CPT

## 2019-11-13 PROCEDURE — 74011250636 HC RX REV CODE- 250/636: Performed by: EMERGENCY MEDICINE

## 2019-11-13 PROCEDURE — 76830 TRANSVAGINAL US NON-OB: CPT

## 2019-11-13 PROCEDURE — 99284 EMERGENCY DEPT VISIT MOD MDM: CPT

## 2019-11-13 PROCEDURE — 81003 URINALYSIS AUTO W/O SCOPE: CPT

## 2019-11-13 PROCEDURE — 85025 COMPLETE CBC W/AUTO DIFF WBC: CPT

## 2019-11-13 RX ORDER — KETOROLAC TROMETHAMINE 30 MG/ML
30 INJECTION, SOLUTION INTRAMUSCULAR; INTRAVENOUS ONCE
Status: COMPLETED | OUTPATIENT
Start: 2019-11-13 | End: 2019-11-13

## 2019-11-13 RX ORDER — KETOROLAC TROMETHAMINE 10 MG/1
10 TABLET, FILM COATED ORAL
Qty: 20 TAB | Refills: 0 | Status: SHIPPED | OUTPATIENT
Start: 2019-11-13 | End: 2019-11-18

## 2019-11-13 RX ADMIN — KETOROLAC TROMETHAMINE 30 MG: 30 INJECTION, SOLUTION INTRAMUSCULAR at 10:18

## 2019-11-13 NOTE — ED NOTES
Pt lying in bed with eyes open, rr even and unlabored, vss, no complains or requests at this time, pt reports improved pain post administration of toradol by previous nurse

## 2019-11-13 NOTE — ED PROVIDER NOTES
EMERGENCY DEPARTMENT HISTORY AND PHYSICAL EXAM    Date: 11/13/2019  Patient Name: Pipe Cronin    History of Presenting Illness     Chief Complaint   Patient presents with    Abdominal Pain         History Provided By: Patient      Pipe Cronin is a 26 y.o. female with no PMH who presents to the emergency department C/O left lower quadrant abdominal pain. Patient states this pain started around midnight after having sexual intercourse with her significant other. She denies any vaginal bleeding, vaginal discharge. She states she does have an IUD. She denies any diarrhea, constipation, fever, chills. States she has never had this type of pain before. Ariella Murray PCP: None    Current Outpatient Medications   Medication Sig Dispense Refill    ketorolac (TORADOL) 10 mg tablet Take 1 Tab by mouth every six (6) hours as needed for Pain for up to 5 days. 20 Tab 0       Past History     Past Medical History:  History reviewed. No pertinent past medical history. Past Surgical History:  History reviewed. No pertinent surgical history. Family History:  History reviewed. No pertinent family history. Social History:  Social History     Tobacco Use    Smoking status: Never Smoker    Smokeless tobacco: Never Used   Substance Use Topics    Alcohol use: No    Drug use: Never       Allergies:  No Known Allergies      Review of Systems   Review of Systems   Constitutional: Negative for fever. Respiratory: Negative for shortness of breath. Cardiovascular: Negative for chest pain. Gastrointestinal: Positive for abdominal pain. Negative for constipation, nausea and vomiting. All other systems reviewed and are negative.         Physical Exam     Vitals:    11/13/19 0944 11/13/19 1110 11/13/19 1113   BP: 122/79 106/65    Pulse: 77 73    Resp: 16 18    Temp: 97.9 °F (36.6 °C)     SpO2: 100% 98% 99%   Weight: 88.5 kg (195 lb)     Height: 5' 9\" (1.753 m)       Physical Exam    Nursing notes and vital signs reviewed    Constitutional: Non toxic appearing, no acute distress  Head: Normocephalic, Atraumatic  Eyes: Pupils are equal, round, and reactive to light, EOMI  Neck: Supple  Cardiovascular: Regular rate and rhythm, no murmurs, rubs, or gallops  Chest: Normal work of breathing and chest excursion bilaterally  Lungs: Clear to ausculation bilaterally  Abdomen: Soft, LLQ ttp, non rigid, nonperitoneal, non distended, normoactive bowel sounds  Back: No evidence of trauma or deformity  Extremities: No evidence of trauma or deformity, no LE edema  Skin: Warm and dry, normal cap refill  Neuro: Alert and appropriate, CN intact, normal speech, strength and sensation full and symmetric bilaterally, normal gait, normal coordination  Psychiatric: Normal mood and affect      Diagnostic Study Results     Labs -     Recent Results (from the past 48 hour(s))   URINALYSIS W/ RFLX MICROSCOPIC    Collection Time: 11/13/19  9:45 AM   Result Value Ref Range    Color YELLOW      Appearance CLEAR      Specific gravity 1.027 1.005 - 1.030      pH (UA) 6.5 5.0 - 8.0      Protein NEGATIVE  NEG mg/dL    Glucose NEGATIVE  NEG mg/dL    Ketone NEGATIVE  NEG mg/dL    Bilirubin NEGATIVE  NEG      Blood NEGATIVE  NEG      Urobilinogen 0.2 0.2 - 1.0 EU/dL    Nitrites NEGATIVE  NEG      Leukocyte Esterase NEGATIVE  NEG     CBC WITH AUTOMATED DIFF    Collection Time: 11/13/19 10:10 AM   Result Value Ref Range    WBC 9.2 4.6 - 13.2 K/uL    RBC 4.19 (L) 4.20 - 5.30 M/uL    HGB 12.6 12.0 - 16.0 g/dL    HCT 37.7 35.0 - 45.0 %    MCV 90.0 74.0 - 97.0 FL    MCH 30.1 24.0 - 34.0 PG    MCHC 33.4 31.0 - 37.0 g/dL    RDW 13.2 11.6 - 14.5 %    PLATELET 750 860 - 416 K/uL    MPV 9.7 9.2 - 11.8 FL    NEUTROPHILS 68 40 - 73 %    LYMPHOCYTES 25 21 - 52 %    MONOCYTES 6 3 - 10 %    EOSINOPHILS 1 0 - 5 %    BASOPHILS 0 0 - 2 %    ABS. NEUTROPHILS 6.2 1.8 - 8.0 K/UL    ABS. LYMPHOCYTES 2.3 0.9 - 3.6 K/UL    ABS. MONOCYTES 0.6 0.05 - 1.2 K/UL    ABS.  EOSINOPHILS 0.1 0.0 - 0.4 K/UL    ABS. BASOPHILS 0.0 0.0 - 0.1 K/UL    DF AUTOMATED     METABOLIC PANEL, COMPREHENSIVE    Collection Time: 11/13/19 10:10 AM   Result Value Ref Range    Sodium 140 136 - 145 mmol/L    Potassium 3.6 3.5 - 5.5 mmol/L    Chloride 106 100 - 111 mmol/L    CO2 26 21 - 32 mmol/L    Anion gap 8 3.0 - 18 mmol/L    Glucose 89 74 - 99 mg/dL    BUN 8 7.0 - 18 MG/DL    Creatinine 0.59 (L) 0.6 - 1.3 MG/DL    BUN/Creatinine ratio 14 12 - 20      GFR est AA >60 >60 ml/min/1.73m2    GFR est non-AA >60 >60 ml/min/1.73m2    Calcium 8.7 8.5 - 10.1 MG/DL    Bilirubin, total 0.4 0.2 - 1.0 MG/DL    ALT (SGPT) 21 13 - 56 U/L    AST (SGOT) 15 10 - 38 U/L    Alk. phosphatase 55 45 - 117 U/L    Protein, total 7.5 6.4 - 8.2 g/dL    Albumin 4.0 3.4 - 5.0 g/dL    Globulin 3.5 2.0 - 4.0 g/dL    A-G Ratio 1.1 0.8 - 1.7     HCG URINE, QL. - POC    Collection Time: 11/13/19 10:16 AM   Result Value Ref Range    Pregnancy test,urine (POC) NEGATIVE  NEG         Radiologic Studies -   US TRANSVAGINAL W DOPPLER   Final Result   IMPRESSION:      1. Intrauterine contraceptive device present in expected position. 2. Normal blood flow to each ovary without evidence of ovarian torsion. 3. Large left ovarian cyst, measuring approximately 5.5 x 4.7 x 6.3 cm in size. CT Results  (Last 48 hours)    None        CXR Results  (Last 48 hours)    None          Medications given in the ED-  Medications   ketorolac (TORADOL) injection 30 mg (30 mg IntraVENous Given 11/13/19 1018)         Medical Decision Making   I am the first provider for this patient. I reviewed the vital signs, available nursing notes, past medical history, past surgical history, family history and social history. Vital Signs-Reviewed the patient's vital signs.     Pulse Oximetry Analysis - 99% on room air     Cardiac Monitor:  Rate: 73 bpm  Rhythm: sinus    Records Reviewed: Nursing Notes    Procedures:  Procedures    ED Course:   Patient was given Toradol with improvement of her pain. Laboratory findings unremarkable. Urinary studies normal.  Ultrasound showed an approximate 5 x 6 cm ovarian cyst.  I discussed with the patient the results of her laboratory findings and imaging studies. I recommended she follow-up with her OB/GYN for reevaluation long-term management of her ovarian cyst.  I did state due to the size that she has had a increased risk for developing complications such as ovarian torsion's or rupture. I recommended she come back to the emergency department if her symptoms worsen despite home treatment. She understands and agrees to plan    Diagnosis and Disposition       DISCHARGE NOTE:    Carrington Deras  results have been reviewed with her. She has been counseled regarding her diagnosis, treatment, and plan. She verbally conveys understanding and agreement of the signs, symptoms, diagnosis, treatment and prognosis and additionally agrees to follow up as discussed. She also agrees with the care-plan and conveys that all of her questions have been answered. I have also provided discharge instructions for her that include: educational information regarding their diagnosis and treatment, and list of reasons why they would want to return to the ED prior to their follow-up appointment, should her condition change. She has been provided with education for proper emergency department utilization. CLINICAL IMPRESSION:    1. Cyst of left ovary    2. Abdominal pain, LLQ (left lower quadrant)        PLAN:  1. D/C Home  2. Current Discharge Medication List      START taking these medications    Details   ketorolac (TORADOL) 10 mg tablet Take 1 Tab by mouth every six (6) hours as needed for Pain for up to 5 days. Qty: 20 Tab, Refills: 0           3.    Follow-up Information     Follow up With Specialties Details Why Contact Info    Your obgyn  Schedule an appointment as soon as possible for a visit in 2 days As needed, If symptoms worsen     [facility] Emergency Medicine Go to  If symptoms worsen [address] [place] [id number]  [phone]        _______________________________      Please note that this dictation was completed with [employer], the computer voice recognition software. Quite often unanticipated grammatical, syntax, homophones, and other interpretive errors are inadvertently transcribed by the computer software. Please disregard these errors. Please excuse any errors that have escaped final proofreading.

## 2020-01-24 ENCOUNTER — HOSPITAL ENCOUNTER (OUTPATIENT)
Dept: LAB | Age: 30
Discharge: HOME | End: 2020-01-24
Attending: OBSTETRICS & GYNECOLOGY
Payer: COMMERCIAL

## 2020-01-24 ENCOUNTER — ANESTHESIA EVENT (OUTPATIENT)
Dept: SURGERY | Age: 30
End: 2020-01-24
Payer: COMMERCIAL

## 2020-01-24 LAB
BASOPHILS # BLD: 0 K/UL (ref 0–0.1)
BASOPHILS NFR BLD: 1 % (ref 0–2)
DIFFERENTIAL METHOD BLD: NORMAL
EOSINOPHIL # BLD: 0.2 K/UL (ref 0–0.4)
EOSINOPHIL NFR BLD: 4 % (ref 0–5)
ERYTHROCYTE [DISTWIDTH] IN BLOOD BY AUTOMATED COUNT: 12.3 % (ref 11.6–14.5)
HCG SERPL QL: NEGATIVE
HCT VFR BLD AUTO: 38.8 % (ref 35–45)
HGB BLD-MCNC: 12.3 G/DL (ref 12–16)
LYMPHOCYTES # BLD: 1.9 K/UL (ref 0.9–3.6)
LYMPHOCYTES NFR BLD: 33 % (ref 21–52)
MCH RBC QN AUTO: 29.1 PG (ref 24–34)
MCHC RBC AUTO-ENTMCNC: 31.7 G/DL (ref 31–37)
MCV RBC AUTO: 91.9 FL (ref 74–97)
MONOCYTES # BLD: 0.4 K/UL (ref 0.05–1.2)
MONOCYTES NFR BLD: 7 % (ref 3–10)
NEUTS SEG # BLD: 3.2 K/UL (ref 1.8–8)
NEUTS SEG NFR BLD: 55 % (ref 40–73)
PLATELET # BLD AUTO: 295 K/UL (ref 135–420)
PMV BLD AUTO: 10.5 FL (ref 9.2–11.8)
RBC # BLD AUTO: 4.22 M/UL (ref 4.2–5.3)
WBC # BLD AUTO: 5.9 K/UL (ref 4.6–13.2)

## 2020-01-24 PROCEDURE — 84703 CHORIONIC GONADOTROPIN ASSAY: CPT

## 2020-01-24 PROCEDURE — 85025 COMPLETE CBC W/AUTO DIFF WBC: CPT

## 2020-01-24 PROCEDURE — 36415 COLL VENOUS BLD VENIPUNCTURE: CPT

## 2020-01-27 ENCOUNTER — ANESTHESIA (OUTPATIENT)
Dept: SURGERY | Age: 30
End: 2020-01-27
Payer: COMMERCIAL

## 2020-01-27 ENCOUNTER — HOSPITAL ENCOUNTER (OUTPATIENT)
Age: 30
Setting detail: OUTPATIENT SURGERY
Discharge: HOME | End: 2020-01-27
Attending: OBSTETRICS & GYNECOLOGY | Admitting: OBSTETRICS & GYNECOLOGY
Payer: COMMERCIAL

## 2020-01-27 VITALS
TEMPERATURE: 97.1 F | DIASTOLIC BLOOD PRESSURE: 67 MMHG | SYSTOLIC BLOOD PRESSURE: 111 MMHG | RESPIRATION RATE: 12 BRPM | WEIGHT: 205.19 LBS | HEART RATE: 57 BPM | OXYGEN SATURATION: 99 % | HEIGHT: 69 IN | BODY MASS INDEX: 30.39 KG/M2

## 2020-01-27 DIAGNOSIS — R10.32 ABDOMINAL PAIN, LLQ (LEFT LOWER QUADRANT): Primary | ICD-10-CM

## 2020-01-27 LAB — HCG UR QL: NEGATIVE

## 2020-01-27 PROCEDURE — 77030020829: Performed by: OBSTETRICS & GYNECOLOGY

## 2020-01-27 PROCEDURE — 88304 TISSUE EXAM BY PATHOLOGIST: CPT

## 2020-01-27 PROCEDURE — 77030006643: Performed by: ANESTHESIOLOGY

## 2020-01-27 PROCEDURE — 81025 URINE PREGNANCY TEST: CPT

## 2020-01-27 PROCEDURE — 88305 TISSUE EXAM BY PATHOLOGIST: CPT

## 2020-01-27 PROCEDURE — 77030040361 HC SLV COMPR DVT MDII -B: Performed by: OBSTETRICS & GYNECOLOGY

## 2020-01-27 PROCEDURE — 76210000021 HC REC RM PH II 0.5 TO 1 HR: Performed by: OBSTETRICS & GYNECOLOGY

## 2020-01-27 PROCEDURE — 77030018836 HC SOL IRR NACL ICUM -A: Performed by: OBSTETRICS & GYNECOLOGY

## 2020-01-27 PROCEDURE — 76060000035 HC ANESTHESIA 2 TO 2.5 HR: Performed by: OBSTETRICS & GYNECOLOGY

## 2020-01-27 PROCEDURE — 74011000250 HC RX REV CODE- 250: Performed by: OBSTETRICS & GYNECOLOGY

## 2020-01-27 PROCEDURE — 77030008574 HC TBNG SUC IRR STRY -B: Performed by: OBSTETRICS & GYNECOLOGY

## 2020-01-27 PROCEDURE — 77030020782 HC GWN BAIR PAWS FLX 3M -B: Performed by: OBSTETRICS & GYNECOLOGY

## 2020-01-27 PROCEDURE — 77030034154 HC SHR COAG HARM ACE J&J -F: Performed by: OBSTETRICS & GYNECOLOGY

## 2020-01-27 PROCEDURE — 74011250636 HC RX REV CODE- 250/636: Performed by: OBSTETRICS & GYNECOLOGY

## 2020-01-27 PROCEDURE — 77030032060 HC PWDR HEMSTAT ARISTA ASRB 3GM BARD -C: Performed by: OBSTETRICS & GYNECOLOGY

## 2020-01-27 PROCEDURE — 77030011294 HC FCPS BPLR MCR J&J -B: Performed by: OBSTETRICS & GYNECOLOGY

## 2020-01-27 PROCEDURE — 77030020255 HC SOL INJ LR 1000ML BG: Performed by: OBSTETRICS & GYNECOLOGY

## 2020-01-27 PROCEDURE — 77030008477 HC STYL SATN SLP COVD -A: Performed by: ANESTHESIOLOGY

## 2020-01-27 PROCEDURE — 77030016151 HC PROTCTR LNS DFOG COVD -B: Performed by: OBSTETRICS & GYNECOLOGY

## 2020-01-27 PROCEDURE — 77030027743 HC APPL F/HEMSTAT BARD -B: Performed by: OBSTETRICS & GYNECOLOGY

## 2020-01-27 PROCEDURE — 74011000250 HC RX REV CODE- 250: Performed by: ANESTHESIOLOGY

## 2020-01-27 PROCEDURE — 77030040830 HC CATH URETH FOL MDII -A: Performed by: OBSTETRICS & GYNECOLOGY

## 2020-01-27 PROCEDURE — 74011250636 HC RX REV CODE- 250/636: Performed by: ANESTHESIOLOGY

## 2020-01-27 PROCEDURE — 76010000131 HC OR TIME 2 TO 2.5 HR: Performed by: OBSTETRICS & GYNECOLOGY

## 2020-01-27 PROCEDURE — 74011000272 HC RX REV CODE- 272: Performed by: OBSTETRICS & GYNECOLOGY

## 2020-01-27 PROCEDURE — 77030039266 HC ADH SKN EXOFIN S2SG -A: Performed by: OBSTETRICS & GYNECOLOGY

## 2020-01-27 PROCEDURE — 77030012770 HC TRCR OPT FX AMR -B: Performed by: OBSTETRICS & GYNECOLOGY

## 2020-01-27 PROCEDURE — 77030002933 HC SUT MCRYL J&J -A: Performed by: OBSTETRICS & GYNECOLOGY

## 2020-01-27 PROCEDURE — 76210000063 HC OR PH I REC FIRST 0.5 HR: Performed by: OBSTETRICS & GYNECOLOGY

## 2020-01-27 PROCEDURE — 74011250637 HC RX REV CODE- 250/637: Performed by: ANESTHESIOLOGY

## 2020-01-27 PROCEDURE — 77030008683 HC TU ET CUF COVD -A: Performed by: ANESTHESIOLOGY

## 2020-01-27 RX ORDER — HYDROMORPHONE HYDROCHLORIDE 2 MG/ML
0.5 INJECTION, SOLUTION INTRAMUSCULAR; INTRAVENOUS; SUBCUTANEOUS
Status: DISCONTINUED | OUTPATIENT
Start: 2020-01-27 | End: 2020-01-27

## 2020-01-27 RX ORDER — DEXAMETHASONE SODIUM PHOSPHATE 4 MG/ML
INJECTION, SOLUTION INTRA-ARTICULAR; INTRALESIONAL; INTRAMUSCULAR; INTRAVENOUS; SOFT TISSUE AS NEEDED
Status: DISCONTINUED | OUTPATIENT
Start: 2020-01-27 | End: 2020-01-27 | Stop reason: HOSPADM

## 2020-01-27 RX ORDER — ONDANSETRON 2 MG/ML
INJECTION INTRAMUSCULAR; INTRAVENOUS AS NEEDED
Status: DISCONTINUED | OUTPATIENT
Start: 2020-01-27 | End: 2020-01-27 | Stop reason: HOSPADM

## 2020-01-27 RX ORDER — OXYCODONE AND ACETAMINOPHEN 5; 325 MG/1; MG/1
1 TABLET ORAL AS NEEDED
Status: DISCONTINUED | OUTPATIENT
Start: 2020-01-27 | End: 2020-01-27 | Stop reason: HOSPADM

## 2020-01-27 RX ORDER — HYDROMORPHONE HYDROCHLORIDE 2 MG/ML
INJECTION, SOLUTION INTRAMUSCULAR; INTRAVENOUS; SUBCUTANEOUS AS NEEDED
Status: DISCONTINUED | OUTPATIENT
Start: 2020-01-27 | End: 2020-01-27 | Stop reason: HOSPADM

## 2020-01-27 RX ORDER — LIDOCAINE HYDROCHLORIDE 20 MG/ML
INJECTION, SOLUTION EPIDURAL; INFILTRATION; INTRACAUDAL; PERINEURAL AS NEEDED
Status: DISCONTINUED | OUTPATIENT
Start: 2020-01-27 | End: 2020-01-27 | Stop reason: HOSPADM

## 2020-01-27 RX ORDER — KETAMINE HCL IN 0.9 % NACL 50 MG/5 ML
SYRINGE (ML) INTRAVENOUS AS NEEDED
Status: DISCONTINUED | OUTPATIENT
Start: 2020-01-27 | End: 2020-01-27 | Stop reason: HOSPADM

## 2020-01-27 RX ORDER — SODIUM CHLORIDE, SODIUM LACTATE, POTASSIUM CHLORIDE, CALCIUM CHLORIDE 600; 310; 30; 20 MG/100ML; MG/100ML; MG/100ML; MG/100ML
125 INJECTION, SOLUTION INTRAVENOUS CONTINUOUS
Status: DISCONTINUED | OUTPATIENT
Start: 2020-01-27 | End: 2020-01-27 | Stop reason: HOSPADM

## 2020-01-27 RX ORDER — OXYCODONE AND ACETAMINOPHEN 5; 325 MG/1; MG/1
1 TABLET ORAL
Qty: 10 TAB | Refills: 0 | Status: SHIPPED | OUTPATIENT
Start: 2020-01-27 | End: 2020-01-30

## 2020-01-27 RX ORDER — SILVER NITRATE 38.21; 12.74 MG/1; MG/1
STICK TOPICAL AS NEEDED
Status: DISCONTINUED | OUTPATIENT
Start: 2020-01-27 | End: 2020-01-27 | Stop reason: HOSPADM

## 2020-01-27 RX ORDER — FENTANYL CITRATE 50 UG/ML
25 INJECTION, SOLUTION INTRAMUSCULAR; INTRAVENOUS AS NEEDED
Status: DISCONTINUED | OUTPATIENT
Start: 2020-01-27 | End: 2020-01-27 | Stop reason: HOSPADM

## 2020-01-27 RX ORDER — FENTANYL CITRATE 50 UG/ML
INJECTION, SOLUTION INTRAMUSCULAR; INTRAVENOUS AS NEEDED
Status: DISCONTINUED | OUTPATIENT
Start: 2020-01-27 | End: 2020-01-27 | Stop reason: HOSPADM

## 2020-01-27 RX ORDER — SODIUM CHLORIDE 0.9 % (FLUSH) 0.9 %
5-40 SYRINGE (ML) INJECTION AS NEEDED
Status: DISCONTINUED | OUTPATIENT
Start: 2020-01-27 | End: 2020-01-27 | Stop reason: HOSPADM

## 2020-01-27 RX ORDER — ROCURONIUM BROMIDE 10 MG/ML
INJECTION, SOLUTION INTRAVENOUS AS NEEDED
Status: DISCONTINUED | OUTPATIENT
Start: 2020-01-27 | End: 2020-01-27 | Stop reason: HOSPADM

## 2020-01-27 RX ORDER — IBUPROFEN 600 MG/1
600 TABLET ORAL
Qty: 30 TAB | Refills: 0 | Status: SHIPPED | OUTPATIENT
Start: 2020-01-27

## 2020-01-27 RX ORDER — SODIUM CHLORIDE 0.9 % (FLUSH) 0.9 %
5-40 SYRINGE (ML) INJECTION EVERY 8 HOURS
Status: DISCONTINUED | OUTPATIENT
Start: 2020-01-27 | End: 2020-01-27 | Stop reason: HOSPADM

## 2020-01-27 RX ORDER — KETOROLAC TROMETHAMINE 15 MG/ML
INJECTION, SOLUTION INTRAMUSCULAR; INTRAVENOUS AS NEEDED
Status: DISCONTINUED | OUTPATIENT
Start: 2020-01-27 | End: 2020-01-27 | Stop reason: HOSPADM

## 2020-01-27 RX ORDER — OXYCODONE AND ACETAMINOPHEN 5; 325 MG/1; MG/1
2 TABLET ORAL
Status: DISCONTINUED | OUTPATIENT
Start: 2020-01-27 | End: 2020-01-27 | Stop reason: HOSPADM

## 2020-01-27 RX ORDER — SODIUM CHLORIDE, SODIUM LACTATE, POTASSIUM CHLORIDE, CALCIUM CHLORIDE 600; 310; 30; 20 MG/100ML; MG/100ML; MG/100ML; MG/100ML
1000 INJECTION, SOLUTION INTRAVENOUS CONTINUOUS
Status: DISCONTINUED | OUTPATIENT
Start: 2020-01-27 | End: 2020-01-27 | Stop reason: HOSPADM

## 2020-01-27 RX ORDER — DIPHENHYDRAMINE HYDROCHLORIDE 50 MG/ML
12.5 INJECTION, SOLUTION INTRAMUSCULAR; INTRAVENOUS
Status: DISCONTINUED | OUTPATIENT
Start: 2020-01-27 | End: 2020-01-27 | Stop reason: HOSPADM

## 2020-01-27 RX ORDER — GLYCOPYRROLATE 0.2 MG/ML
INJECTION INTRAMUSCULAR; INTRAVENOUS AS NEEDED
Status: DISCONTINUED | OUTPATIENT
Start: 2020-01-27 | End: 2020-01-27 | Stop reason: HOSPADM

## 2020-01-27 RX ORDER — ACETAMINOPHEN 500 MG
1000 TABLET ORAL
Status: COMPLETED | OUTPATIENT
Start: 2020-01-27 | End: 2020-01-27

## 2020-01-27 RX ORDER — FLUMAZENIL 0.1 MG/ML
0.2 INJECTION INTRAVENOUS
Status: DISCONTINUED | OUTPATIENT
Start: 2020-01-27 | End: 2020-01-27 | Stop reason: HOSPADM

## 2020-01-27 RX ORDER — NEOSTIGMINE METHYLSULFATE 1 MG/ML
INJECTION, SOLUTION INTRAVENOUS AS NEEDED
Status: DISCONTINUED | OUTPATIENT
Start: 2020-01-27 | End: 2020-01-27 | Stop reason: HOSPADM

## 2020-01-27 RX ORDER — BUPIVACAINE HYDROCHLORIDE 2.5 MG/ML
INJECTION, SOLUTION EPIDURAL; INFILTRATION; INTRACAUDAL AS NEEDED
Status: DISCONTINUED | OUTPATIENT
Start: 2020-01-27 | End: 2020-01-27 | Stop reason: HOSPADM

## 2020-01-27 RX ORDER — PROPOFOL 10 MG/ML
INJECTION, EMULSION INTRAVENOUS AS NEEDED
Status: DISCONTINUED | OUTPATIENT
Start: 2020-01-27 | End: 2020-01-27 | Stop reason: HOSPADM

## 2020-01-27 RX ORDER — HYDROMORPHONE HYDROCHLORIDE 2 MG/ML
0.5 INJECTION, SOLUTION INTRAMUSCULAR; INTRAVENOUS; SUBCUTANEOUS
Status: DISCONTINUED | OUTPATIENT
Start: 2020-01-27 | End: 2020-01-27 | Stop reason: HOSPADM

## 2020-01-27 RX ORDER — ALBUTEROL SULFATE 0.83 MG/ML
2.5 SOLUTION RESPIRATORY (INHALATION) AS NEEDED
Status: DISCONTINUED | OUTPATIENT
Start: 2020-01-27 | End: 2020-01-27 | Stop reason: HOSPADM

## 2020-01-27 RX ORDER — NALOXONE HYDROCHLORIDE 0.4 MG/ML
0.2 INJECTION, SOLUTION INTRAMUSCULAR; INTRAVENOUS; SUBCUTANEOUS AS NEEDED
Status: DISCONTINUED | OUTPATIENT
Start: 2020-01-27 | End: 2020-01-27 | Stop reason: HOSPADM

## 2020-01-27 RX ORDER — OXYCODONE AND ACETAMINOPHEN 5; 325 MG/1; MG/1
1 TABLET ORAL
Status: DISCONTINUED | OUTPATIENT
Start: 2020-01-27 | End: 2020-01-27 | Stop reason: HOSPADM

## 2020-01-27 RX ORDER — MIDAZOLAM HYDROCHLORIDE 1 MG/ML
INJECTION, SOLUTION INTRAMUSCULAR; INTRAVENOUS AS NEEDED
Status: DISCONTINUED | OUTPATIENT
Start: 2020-01-27 | End: 2020-01-27 | Stop reason: HOSPADM

## 2020-01-27 RX ADMIN — Medication 10 MG: at 09:56

## 2020-01-27 RX ADMIN — Medication 30 MG: at 09:32

## 2020-01-27 RX ADMIN — ACETAMINOPHEN 1000 MG: 500 TABLET ORAL at 08:31

## 2020-01-27 RX ADMIN — MIDAZOLAM 2 MG: 1 INJECTION INTRAMUSCULAR; INTRAVENOUS at 09:06

## 2020-01-27 RX ADMIN — LIDOCAINE HYDROCHLORIDE 60 MG: 20 INJECTION, SOLUTION EPIDURAL; INFILTRATION; INTRACAUDAL; PERINEURAL at 09:14

## 2020-01-27 RX ADMIN — GLYCOPYRROLATE 0.2 MG: 0.2 INJECTION INTRAMUSCULAR; INTRAVENOUS at 09:06

## 2020-01-27 RX ADMIN — SODIUM CHLORIDE, SODIUM LACTATE, POTASSIUM CHLORIDE, AND CALCIUM CHLORIDE 125 ML/HR: 600; 310; 30; 20 INJECTION, SOLUTION INTRAVENOUS at 11:25

## 2020-01-27 RX ADMIN — SODIUM CHLORIDE, SODIUM LACTATE, POTASSIUM CHLORIDE, AND CALCIUM CHLORIDE: 600; 310; 30; 20 INJECTION, SOLUTION INTRAVENOUS at 10:12

## 2020-01-27 RX ADMIN — Medication 2 MG: at 10:44

## 2020-01-27 RX ADMIN — KETOROLAC TROMETHAMINE 30 MG: 15 INJECTION, SOLUTION INTRAMUSCULAR; INTRAVENOUS at 10:42

## 2020-01-27 RX ADMIN — PROPOFOL 140 MG: 10 INJECTION, EMULSION INTRAVENOUS at 09:15

## 2020-01-27 RX ADMIN — Medication 40 MG: at 09:16

## 2020-01-27 RX ADMIN — FENTANYL CITRATE 50 MCG: 50 INJECTION, SOLUTION INTRAMUSCULAR; INTRAVENOUS at 09:23

## 2020-01-27 RX ADMIN — HYDROMORPHONE HYDROCHLORIDE 1 MG: 2 INJECTION, SOLUTION INTRAMUSCULAR; INTRAVENOUS; SUBCUTANEOUS at 09:41

## 2020-01-27 RX ADMIN — SODIUM CHLORIDE, SODIUM LACTATE, POTASSIUM CHLORIDE, AND CALCIUM CHLORIDE 125 ML/HR: 600; 310; 30; 20 INJECTION, SOLUTION INTRAVENOUS at 07:37

## 2020-01-27 RX ADMIN — GLYCOPYRROLATE 0.4 MG: 0.2 INJECTION INTRAMUSCULAR; INTRAVENOUS at 10:44

## 2020-01-27 RX ADMIN — FENTANYL CITRATE 100 MCG: 50 INJECTION, SOLUTION INTRAMUSCULAR; INTRAVENOUS at 09:14

## 2020-01-27 RX ADMIN — FENTANYL CITRATE 100 MCG: 50 INJECTION, SOLUTION INTRAMUSCULAR; INTRAVENOUS at 10:32

## 2020-01-27 RX ADMIN — FENTANYL CITRATE 50 MCG: 50 INJECTION, SOLUTION INTRAMUSCULAR; INTRAVENOUS at 09:40

## 2020-01-27 RX ADMIN — ONDANSETRON HYDROCHLORIDE 4 MG: 2 INJECTION INTRAMUSCULAR; INTRAVENOUS at 10:29

## 2020-01-27 RX ADMIN — DEXAMETHASONE SODIUM PHOSPHATE 4 MG: 4 INJECTION, SOLUTION INTRAMUSCULAR; INTRAVENOUS at 09:28

## 2020-01-27 RX ADMIN — Medication 10 MG: at 10:27

## 2020-01-27 NOTE — ANESTHESIA POSTPROCEDURE EVALUATION
Post-Anesthesia Evaluation and Assessment    Cardiovascular Function/Vital Signs  Visit Vitals  /68   Pulse 62   Temp 36.1 °C (97 °F)   Resp 11   Ht 5' 9\" (1.753 m)   Wt 93.1 kg (205 lb 3 oz)   SpO2 100%   BMI 30.30 kg/m²       Patient is status post Procedure(s):  LAPAROSCOPIC LEFT OVARIAN CYSTECTOMY. Nausea/Vomiting: Controlled. Postoperative hydration reviewed and adequate. Pain:  Pain Scale 1: Numeric (0 - 10) (01/27/20 1130)  Pain Intensity 1: 0 (01/27/20 1130)   Managed. Neurological Status:   Neuro (WDL): Within Defined Limits (01/27/20 1130)   At baseline. Mental Status and Level of Consciousness: Baseline and appropriate for discharge. Pulmonary Status:   O2 Device: Nasal cannula (01/27/20 1130)   Adequate oxygenation and airway patent. Complications related to anesthesia: None    Post-anesthesia assessment completed. No concerns. Patient has met all discharge requirements.     Signed By: Dioni Renee MD    January 27, 2020

## 2020-01-27 NOTE — PERIOP NOTES
Reviewed PTA medication list with patient/caregiver and patient/caregiver denies any additional medications. Patient admits to having a responsible adult care for them for at least 24 hours after surgery.     Dual skin assessment completed by Shiloh MCDOWELL and Charlotte Esquivel RN.

## 2020-01-27 NOTE — ANESTHESIA PREPROCEDURE EVALUATION
Relevant Problems   No relevant active problems       Anesthetic History        Pertinent negatives: No PONV       Review of Systems / Medical History  Patient summary reviewed, nursing notes reviewed and pertinent labs reviewed    Pulmonary              Pertinent negatives: No asthma, sleep apnea and smoker     Neuro/Psych           Pertinent negatives: No seizures and CVA   Cardiovascular                Pertinent negatives: No hypertension, valvular problems/murmurs and dysrhythmias  Exercise tolerance: >4 METS     GI/Hepatic/Renal             Pertinent negatives: No GERD, liver disease and renal disease   Endo/Other        Obesity  Pertinent negatives: No diabetes   Other Findings              Physical Exam    Airway  Mallampati: II  TM Distance: 4 - 6 cm  Neck ROM: normal range of motion   Mouth opening: Normal     Cardiovascular    Rhythm: regular  Rate: normal         Dental  No notable dental hx       Pulmonary  Breath sounds clear to auscultation               Abdominal  GI exam deferred       Other Findings            Anesthetic Plan    ASA: 2  Anesthesia type: general            Anesthetic plan and risks discussed with: Patient      ASA 2 for BMI >30. Plan GETA. Pt understands plan and accepts risks. All questions answered. Consent signed.

## 2020-01-27 NOTE — PERIOP NOTES
TRANSFER - IN REPORT:    Verbal report received from Dr. Horvath Prior nurse  on Jeanna Lopez  being received from OR (unit) for routine progression of care      Report consisted of patients Situation, Background, Assessment and   Recommendations(SBAR). Information from the following report(s) OR Summary, Procedure Summary, Intake/Output and MAR2   was reviewed with the receiving nurse. Opportunity for questions and clarification was provided. Assessment completed upon patients arrival to unit and care assumed.

## 2020-01-27 NOTE — DISCHARGE INSTRUCTIONS
Patient Education     DISCHARGE SUMMARY from Nurse    PATIENT INSTRUCTIONS:    After general anesthesia or intravenous sedation, for 24 hours or while taking prescription Narcotics:  · Limit your activities  · Do not drive and operate hazardous machinery  · Do not make important personal or business decisions  · Do  not drink alcoholic beverages  · If you have not urinated within 8 hours after discharge, please contact your surgeon on call. Report the following to your surgeon:  · Excessive pain, swelling, redness or odor of or around the surgical area  · Temperature over 100.5  · Nausea and vomiting lasting longer than 4 hours or if unable to take medications  · Any signs of decreased circulation or nerve impairment to extremity: change in color, persistent  numbness, tingling, coldness or increase pain  · Any questions    What to do at Home:  LIGHT DIET TODAY ADVANCE AS TOLERATED  OK TO [facility] [address] 2 WEEKS  RETURN TO OFFICE IN 2 WEEKS CALL FOR APPT    If you experience any of the following symptoms heavy bleeding, fevers, severe pain, please follow up with dr Shannon Gunn    *  Please give a list of your current medications to your Primary Care Provider. *  Please update this list whenever your medications are discontinued, doses are      changed, or new medications (including over-the-counter products) are added. *  Please carry medication information at all times in case of emergency situations. These are general instructions for a healthy lifestyle:    No smoking/ No tobacco products/ Avoid exposure to second hand smoke  Surgeon General's Warning:  Quitting smoking now greatly reduces serious risk to your health.     Obesity, smoking, and sedentary lifestyle greatly increases your risk for illness    A healthy diet, regular physical exercise & weight monitoring are important for maintaining a healthy lifestyle    You may be retaining fluid if you have a history of heart failure or if you experience any of the following symptoms:  Weight gain of 3 pounds or more overnight or 5 pounds in a week, increased swelling in our hands or feet or shortness of breath while lying flat in bed. Please call your doctor as soon as you notice any of these symptoms; do not wait until your next office visit. The discharge information has been reviewed with the patient and caregiver. The patient and caregiver verbalized understanding. Discharge medications reviewed with the patient and caregiver and appropriate educational materials and side effects teaching were provided. ___________________________________________________________________________________________________________________________________     Laparoscopy: What to Expect at [address] [place] [address]  After laparoscopic surgery, you are likely to have pain for the next several days. You may also feel like you have the flu. You may have a low fever and feel tired and sick to your stomach. This is common. You should feel better after 1 to 2 weeks. This care sheet gives you a general idea about how long it will take for you to recover. But each person recovers at a different pace. Follow the steps below to get better as quickly as possible. How can you care for yourself at home? Activity    · Rest when you feel tired. Getting enough sleep will help you recover.     · Try to walk each day. Start by walking a little more than you did the day before. Bit by bit, increase the amount you walk. Walking boosts blood flow and helps prevent pneumonia and constipation.     · Avoid strenuous activities, such as bicycle riding, jogging, weight lifting, or aerobic exercise, until your doctor says it is okay.     · Avoid lifting anything that would make you strain. This may include a child, heavy grocery bags and milk containers, a heavy briefcase or backpack, cat litter or dog food bags, or a vacuum .     · You may also have pain in your shoulder.  The pain usually lasts about 1 or 2 days.     · You may drive when you are no longer taking pain medicine and can quickly move your foot from the gas pedal to the brake. You must also be able to sit comfortably for a long period of time, even if you do not plan to go far. You might get caught in traffic.     · You will probably need to take 2 weeks off from work. It depends on the type of work you do and how you feel.     · You may shower 24 to 48 hours after surgery, if your doctor okays it. Pat the cut (incision) dry. Do not take a bath for the first 2 weeks, or until your doctor tells you it is okay. Diet    · If your stomach is upset, try bland, low-fat foods such as plain rice, broiled chicken, toast, and yogurt.     · Drink plenty of fluids (enough so that your urine is light yellow or clear like water) to prevent dehydration. Choose water and other caffeine-free clear liquids. If you have kidney, heart, or liver disease and have to limit fluids, talk with your doctor before you increase the amount of fluids you drink.     · You may notice that your bowel movements are not regular right after your surgery. This is common. Avoid constipation and straining with bowel movements. You may want to take a fiber supplement every day. If you have not had a bowel movement after a couple of days, ask your doctor about taking a mild laxative. Medicines    · Your doctor will tell you if and when you can restart your medicines. He or she will also give you instructions about taking any new medicines.     · If you take blood thinners, such as warfarin (Coumadin), clopidogrel (Plavix), or aspirin, be sure to talk to your doctor. He or she will tell you if and when to start taking those medicines again. Make sure that you understand exactly what your doctor wants you to do.     · Take pain medicines exactly as directed. ? If the doctor gave you a prescription medicine for pain, take it as prescribed.   ? If you are not taking a prescription pain medicine, ask your doctor if you can take an over-the-counter medicine.     · If your doctor prescribed antibiotics, take them as directed. Do not stop taking them just because you feel better. You need to take the full course of antibiotics.     · If you think your pain medicine is making you sick to your stomach:  ? Take your medicine after meals (unless your doctor has told you not to). ? Ask your doctor for a different pain medicine. Incision care    · If you have strips of tape on the incision, leave the tape on for a week or until it falls off.     · Wash the area daily with warm, soapy water and pat it dry. Don't use hydrogen peroxide or alcohol, which can slow healing. You may cover the area with a gauze bandage if it weeps or rubs against clothing. Change the bandage every day. Follow-up care is a key part of your treatment and safety. Be sure to make and go to all appointments, and call your doctor if you are having problems. It's also a good idea to know your test results and keep a list of the medicines you take. When should you call for help? Call 911 anytime you think you may need emergency care. For example, call if:    · You passed out (lost consciousness).     · You are short of breath.    Call your doctor now or seek immediate medical care if:    · You have pain that does not get better after you take pain medicine.     · You have loose stitches, or your incision comes open.     · Bright red blood has soaked through your bandage.     · You have signs of infection, such as:  ? Increased pain, swelling, warmth, or redness. ? Red streaks leading from the incision. ? Pus draining from the incision. ? A fever.     · You are sick to your stomach or cannot keep fluids down.     · You have signs of a blood clot in your leg (called a deep vein thrombosis), such as:  ? Pain in your calf, back of the knee, thigh, or groin. ?  Redness and swelling in your leg or groin.     · You cannot pass stools or gas.    Watch closely for any changes in your health, and be sure to contact your doctor if you have any problems. Where can you learn more? Go to http://conner-[facility]adiya.info/. Enter O224 in the search box to learn more about \"Laparoscopy: What to Expect at Home. \"  Current as of: November 7, 2018  Content Version: 12.2  © 2422-6390 [employer]. Care instructions adapted under license by [employer] (which disclaims liability or warranty for this information). If you have questions about a medical condition or this instruction, always ask your healthcare professional. [place] [address] any warranty or liability for your use of this information.   Patient armband removed and shredded

## 2020-01-27 NOTE — OP NOTES
Operative Report      Preoperative diagnosis: Left ovarian cyst, left lower quadrant pain    Postoperative diagnosis: Left ovarian cyst, left lower quadrant pain, left paraovarian cyst, endometriosis    Procedure: Laparoscopic left ovarian cystectomy, excision of left paraovarian cyst    Surgeon: Tasha Mckeon MD    Assistant:  Jaziel Roman    Anesthesia: General    EBL: 10 mL    Complications: None    Findings: Approximately 4 cm follicular cyst on the left ovary with clear fluid. Normal-appearing right ovary and fallopian tubes. A small paraovarian cyst was noted on the left side. There is a 3 mm implant of endometriosis on the right pelvic sidewall which was in close proximity to the ureter. 2 small pea-sized masses were extracted from the cul-de-sac. Specimen: Left ovarian cyst wall, left para tubal cyst, pelvic masses from the cul-de-sac    Implants: None    Procedure: The patient was taken to the operating room where general anesthesia was found to be adequate. She was then prepped and draped in a normal sterile fashion in the dorsolithotomy position. Timeout was completed and all were in agreement. Attention was then turned to the bladder where a Pineda catheter was placed. A sterile weighted speculum was then placed into the patient's vagina and the anterior lip of the cervix was grasped with a single-tooth tenaculum. A Hulka manipulator was then affixed to the anterior lip of the cervix. All instruments were then removed from the patient's vagina. Attention was then turned to the abdomen where the periumbilical fold was grasped with a Allis clamp. The base of the umbilicus was identified and 0.25% Marcaine was then injected. A 5 mm incision was then made with a scalpel. The abdomen was tented and the Varees needle was inserted. Intra-abdominal placement was confirmed with a water-filled syringe. CO2 gas was then used to inflate the abdomen.   Once an adequate pneumoperitoneum was obtained, a 5 mm trocar was inserted under direct visualization of the laparoscope. Patient was then placed in steep Trendelenburg. The above findings were noted. At this time, Marcaine was then injected in the left lower quadrant and a 5 mm incision was made with a scalpel. A 5 mm trocar was inserted under direct visualization of the laparoscope. Using the same procedure, a 5 mm trocar was placed in the right lower quadrant. The left ovary was grasped and elevated on the pelvis. Harmonic scalpel was then used to incise the ovarian stroma. The cyst wall was identified and ruptured. Clear fluid was noted. The cyst wall was then teased off of the ovarian tissue. The bipolar cautery was then used to obtain hemostasis. Edi was placed in the ovary to ensure hemostasis. Attention was then turned to the paratubal cyst which was grasped and elevated. It was excised using the harmonic scalpel and removed intact from the abdomen. The abdomen was then irrigated with a copious amount of fluid. During this irrigation, there is noted to be a small pea-like structures in the cul-de-sac which were not attached to any organ. These were removed and sent to pathology. Upon further inspection of the cul-de-sac, there is noted to be a 3 mm implants of endometriosis on the right lateral sidewall. Upon tracing the ureter, it was noted to be in close proximity therefore this area was cauterized. The ovary was again evaluated for hemostasis. Once excellent hemostasis was noted, all instruments were then removed from the patient's abdomen. The incisions were closed with 4-0 Monocryl suture in a subcuticular fashion followed by Dermabond. All instruments were then removed from the patient's vagina. There is noted to be some bleeding from the anterior lip of the cervix. Silver nitrate was applied and good hemostasis was noted.   The Pineda catheter was removed from the bladder and the urine was noted to be clear. Patient tolerated procedure well. All sponge and needle counts were correct. Patient was taken to recovery room in stable condition.     Lesley Brady MD

## 2020-01-27 NOTE — INTERVAL H&P NOTE
H&P Update: 
Deniz Avila was seen and examined. History and physical has been reviewed. The patient has been examined.  There have been no significant clinical changes since the completion of the originally dated History and Physical.

## 2024-03-20 NOTE — IP AVS SNAPSHOT
[facility] 
 
 
 [address] [facility] [address] 
[phone] Patient: Theron Covert MRN: [id number] ALVARO:[birth year] About your hospitalization You were admitted on:  February 9, 2018 You last received care in the:  [address] [place] [address] You were discharged on:  February 10, 2018 Why you were hospitalized Your primary diagnosis was:  Vaginal Delivery Your diagnoses also included:  Pregnancy Follow-up Information Follow up With Details Comments Contact Info None   None ([phone]) Patient stated that they have no PCP Albania Rai MD Schedule an appointment as soon as possible for a visit in 6 weeks  [address] [facility] [address] [place] [address] 
[phone] Discharge Orders None A check janusz indicates which time of day the medication should be taken. My Medications START taking these medications Instructions Each Dose to Equal  
 Morning Noon Evening Bedtime  
 ibuprofen 800 mg tablet Commonly known as:  MOTRIN Your last dose was: Your next dose is: Take 1 Tab by mouth every eight (8) hours as needed. 800 mg CONTINUE taking these medications Instructions Each Dose to Equal  
 Morning Noon Evening Bedtime PRENATAL DHA+COMPLETE PRENATAL -300 mg-mcg-mg Cmpk Generic drug:  [id number] chapito-folic-dha Your last dose was: Your next dose is: Take 1 Tab by mouth daily. Indications: pregnancy 1 Tab Where to Get Your Medications Information on where to get these meds will be given to you by the nurse or doctor. ! Ask your nurse or doctor about these medications  
  ibuprofen 800 mg tablet Discharge Instructions After Your Delivery (the Postpartum Period): After Your Visit Your Care Instructions Congratulations on the birth of your baby. Like pregnancy, the  period can be a time of excitement, ludy, and exhaustion. You may look at your wondrous little baby and feel happy. You may also be overwhelmed by your new sleep hours and new responsibilities. At first, babies often sleep during the days and are awake at night. They do not have a pattern or routine. They may make sudden gasps, jerk themselves awake, or look like they have crossed eyes. These are all normal, and they may even make you smile. In these first weeks after delivery, try to take good care of yourself. It may take 4 to 6 weeks to feel like yourself again, and possibly longer if you had a  birth. You will likely feel very tired for several weeks. Your days will be full of ups and downs, but lots of ludy as well. Follow-up care is a key part of your treatment and safety. Be sure to make and go to all appointments, and call your doctor if you are having problems. Its also a good idea to know your test results and keep a list of the medicines you take. How can you care for yourself at home? Take care of your body after delivery · Use pads instead of tampons for the bloody flow that may last as long as 2 weeks. · Ease cramps with acetaminophen (Tylenol). · Ease soreness of hemorrhoids and the area between your vagina and rectum with ice compresses or witch hazel pads. · Ease constipation by drinking lots of fluid and eating high-fiber foods. Ask your doctor about over-the-counter stool softeners. · Cleanse yourself with a gentle squeeze of warm water from a bottle instead of wiping with toilet paper. · Take a sitz bath in warm water several times a day. · Wear a good nursing bra. Ease sore and swollen breasts with warm, wet washcloths. · If you are not breast-feeding, use ice rather than heat for breast soreness. · Your period may not start for several months if you are breast-feeding. You may bleed more, and longer at first, than you did before you got pregnant. · Wait until you are healed (about 4 to 6 weeks) before you have sexual intercourse. Your doctor will tell you when it is okay to have sex. · Try not to travel with your baby for 5 or 6 weeks. If you take a long car trip, make frequent stops to walk around and stretch. Avoid exhaustion · Rest every day. Try to nap when your baby naps. · Ask another adult to be with you for a few days after delivery. · Plan for  if you have other children. · Stay flexible so you can eat at odd hours and sleep when you need to. Both you and your baby are making new schedules. · Plan small trips to get out of the house. Change can make you feel less tired. · Ask for help with housework, cooking, and shopping. Remind yourself that your job is to care for your baby. Know about help for postpartum depression · \"Baby blues are common for the first 1 to 2 weeks after birth. You may cry or feel sad or irritable for no reason. · Rest whenever you can. Being tired makes it harder to handle your emotions. · Go for walks with your baby. · Talk to your partner, friends, and family about your feelings. · If your symptoms last for more than a few weeks, or if you feel very depressed, ask your doctor for help. · Postpartum depression can be treated. Support groups and counseling can help. Sometimes medicine can also help. Stay healthy · Eat healthy foods so you have more energy, make good breast milk, and lose extra baby pounds. · If you breast-feed, avoid alcohol and drugs. Stay smoke-free. If you quit during pregnancy, congratulations. · Start daily exercise after 4 to 6 weeks, but rest when you feel tired. · Learn exercises to tone your belly. Do Kegel exercises to regain strength in your pelvic muscles. You can do these exercises while you stand or sit. ¨ Squeeze the same muscles you would use to stop your urine.  Your belly and rear end (buttocks) should not move. ¨ Hold the squeeze for 3 seconds, then relax for 3 seconds. ¨ Repeat the exercise 10 to 15 times for each session. Do three or more sessions each day. · Find a class for new mothers and new babies that has an exercise time. · If you had a  birth, give yourself a bit more time before you exercise, and be careful. When should you call for help? Call 911 anytime you think you may need emergency care. For example, call if: 
· You have sudden, severe pain in your belly. · You passed out (lost consciousness). Call your doctor now or seek immediate medical care if: 
· You have severe vaginal bleeding. You are passing blood clots and soaking through a pad each hour for 2 or more hours. · Your vaginal bleeding seems to be getting heavier or is still bright red 4 days after delivery, or you pass blood clots larger than the size of a golf ball. · You are dizzy or lightheaded, or you feel like you may faint. · You are vomiting or cannot keep fluids down. · You have a fever. · You have new or more belly pain. · You pass tissue (not just blood). · Your breast or breasts have hard, red, or tender areas. · You have an urgent or frequent need to urinate, along with a burning feeling. · You have severe pain, tenderness, or swelling in your vagina or the area between your rectum and vagina. · You have severe pains in your chest, belly, back, or legs. · You have feelings of severe despair or great anxiety. · Your baby is unusually cranky or is sleeping too much. · Your baby's eyes are red or have discharge. · Your baby has white patches on the roof and sides of the mouth or tongue. · Your baby's umbilical cord is foul-smelling, swollen, red, or leaking pus. · There is blood or mucus in your baby's bowel movements. · Your baby has fewer than 6 wet diapers a day. · Your baby does not want to eat, or your baby is throwing up with every feeding. · Your baby has trouble breathing. · Your baby has a rectal temperature of 100.4°F or more, or an underarm temperature over 99.4°F. 
· You baby has a low temperature less than 97.5°F rectal, or less than 97. 0°F underarm. · Your baby's skin looks yellow. Watch closely for changes in your health, and be sure to contact your doctor if you have any problems. Where can you learn more? Go to Digital Karma.be Enter A461 in the search box to learn more about \"After Your Delivery (the Postpartum Period): After Your Visit. \"  
© 1546-8946 [employer]wise, [employer]orporated. Care instructions adapted under license by [employer] (which disclaims liability or warranty for this information). This care instruction is for use with your licensed healthcare professional. If you have questions about a medical condition or this instruction, always ask your healthcare professional. [place] [address] any warranty or liability for your use of this information. Content Version: 9.2.878415; Last Revised: 2010 Speedshape Activation Thank you for requesting access to Speedshape. Please follow the instructions below to securely access and download your online medical record. Speedshape allows you to send messages to your doctor, view your test results, renew your prescriptions, schedule appointments, and more. How Do I Sign Up? 1. In your internet browser, go to [email or web address] 
2. Click on the First Time User? Click Here link in the Sign In box. You will be redirect to the New Member Sign Up page. 3. Enter your Speedshape Access Code exactly as it appears below. You will not need to use this code after youve completed the sign-up process. If you do not sign up before the expiration date, you must request a new code. Speedshape Access Code: [email or web address] Expires: 2018  3:14 PM (This is the date your Speedshape access code will ) 4. Enter the last four digits of your Social Security Number (xxxx) and Date of Birth (mm/dd/yyyy) as indicated and click Submit. You will be taken to the next sign-up page. 5. Create a Cloud Theory ID. This will be your Cloud Theory login ID and cannot be changed, so think of one that is secure and easy to remember. 6. Create a Cloud Theory password. You can change your password at any time. 7. Enter your Password Reset Question and Answer. This can be used at a later time if you forget your password. 8. Enter your e-mail address. You will receive e-mail notification when new information is available in [address] 9. Click Sign Up. You can now view and download portions of your medical record. 10. Click the Download Summary menu link to download a portable copy of your medical information. Additional Information Remember, Cloud Theory is NOT to be used for urgent needs. For medical emergencies, dial 911. Patient armband removed and shredded Gynecology Discharge Summary Name: Cynthia Vela MRN: [id number]  SSN: [id number] YOB: 1990  Age: 28 y.o. Sex: female Admit Date: 2/9/2018 Discharge Date: 2/10/2018 Admitting Physician: Amena Lam MD  
 
Discharge Physician: Jesús Crump RN Admission Diagnoses: labor Pregnancy Discharge Diagnoses:  
Problem List as of 2/10/2018  Date Reviewed: 2/9/2018 Codes Class Noted - Resolved * (Principal)Vaginal delivery ICD-10-CM: O80 
ICD-9-CM: 264  2/9/2018 - Present Pregnancy ICD-10-CM: Z34.90 ICD-9-CM: V22.2  2/9/2018 - Present Activity: No sex, douching, or tampons for 6 weeks or as directed by your physician. No heavy lifting for 6 weeks. No driving while taking pain medication. Diet: Resume pre-hospital diet Follow-up Appointments Procedures  FOLLOW UP VISIT Appointment in: 6 Weeks Standing Status:   Standing Number of Occurrences:   1 Order Specific Question:   Appointment in Answer:   6 Weeks Signed By:  Arnaldo Gtz RN February 10, 2018 Recognize signs and symptoms of STROKE: 
 
F - Face looks uneven. A - Arms unable to move or move evenly. S - Speech slurred or non existent. T - Time - call 911 as soon as signs and symptoms begin - do not go back to bed or wait to see if you get better - TIME IS BRAIN. Introducing [facility]! [facility] introduces GigaTrust patient portal. Now you can access parts of your medical record, email your doctor's office, and request medication refills online. 1. In your internet browser, go to [email or web address] 2. Click on the First Time User? Click Here link in the Sign In box. You will see the New Member Sign Up page. 3. Enter your GigaTrust Access Code exactly as it appears below. You will not need to use this code after youve completed the sign-up process. If you do not sign up before the expiration date, you must request a new code. · GigaTrust Access Code: [email or web address] Expires: 5/11/2018  3:14 PM 
 
4. Enter the last four digits of your Social Security Number (xxxx) and Date of Birth (mm/dd/yyyy) as indicated and click Submit. You will be taken to the next sign-up page. 5. Create a GigaTrust ID. This will be your GigaTrust login ID and cannot be changed, so think of one that is secure and easy to remember. 6. Create a GigaTrust password. You can change your password at any time. 7. Enter your Password Reset Question and Answer. This can be used at a later time if you forget your password. 8. Enter your e-mail address. You will receive e-mail notification when new information is available in [address] 9. Click Sign Up. You can now view and download portions of your medical record. 10. Click the Download Summary menu link to download a portable copy of your medical information. If you have questions, please visit the Frequently Asked Questions section of the MyChart website. Remember, MyChart is NOT to be used for urgent needs. For medical emergencies, dial 911. Now available from your iPhone and Android! Providers Seen During Your Hospitalization Provider Specialty Primary office phone Mynor Brito MD Obstetrics & Gynecology [phone] Your Primary Care Physician (PCP) Primary Care Physician Office Phone Office Fax NONE ** None ** ** None ** You are allergic to the following No active allergies Recent Documentation Height Weight Breastfeeding? BMI OB Status Smoking Status 1.753 m 97.5 kg Yes 31.75 kg/m2 Recent pregnancy Never Smoker Emergency Contacts Name Discharge Info Relation Home Work Mobile Radha Keane DISCHARGE CAREGIVER [3] Mother [14]   [phone] Patient Belongings The following personal items are in your possession at time of discharge: 
  Dental Appliances: None  Visual Aid: None          Jewelry: Ring, Earrings, With patient  Clothing: Footwear, With patient, Shirt, Pants, Undergarments    Other Valuables: Nikhil Ch, Cell Phone, With patient Please provide this summary of care documentation to your next provider. Signatures-by signing, you are acknowledging that this After Visit Summary has been reviewed with you and you have received a copy. Patient Signature:  ____________________________________________________________ Date:  ____________________________________________________________  
  
Juany East Provider Signature:  ____________________________________________________________ Date:  ____________________________________________________________ no

## (undated) DEVICE — TOTAL TRAY, DB, 100% SILI FOLEY, 16FR 10: Brand: MEDLINE

## (undated) DEVICE — SYR 10ML LUER LOK 1/5ML GRAD --

## (undated) DEVICE — FORCEPS BPLR DIA5MM MACRO JAW LAP ENDOPATH

## (undated) DEVICE — DISPOSABLE SUCTION/IRRIGATOR TUBE SET WITH TIP: Brand: AHTO

## (undated) DEVICE — SHEARS ENDOSCP L36CM DIA5MM ULTRASONIC CRV TIP W/ ADV

## (undated) DEVICE — GYN LAPAROSCOPY: Brand: MEDLINE INDUSTRIES, INC.

## (undated) DEVICE — TROCAR: Brand: KII® SLEEVE

## (undated) DEVICE — GARMENT,MEDLINE,DVT,INT,CALF,MED, GEN2: Brand: MEDLINE

## (undated) DEVICE — AGENT HEMSTAT 3GM PURIFIED PLNT STARCH PWD ABSRB ARISTA AH

## (undated) DEVICE — LAPAROSCOPIC TROCAR SLEEVE/SINGLE USE: Brand: KII® OPTICAL ACCESS SYSTEM

## (undated) DEVICE — (D)PREP SKN CHLRAPRP APPL 26ML -- CONVERT TO ITEM 371833

## (undated) DEVICE — NEEDLE HYPO 22GA L1.5IN BLK S STL HUB POLYPR SHLD REG BVL

## (undated) DEVICE — SOL IRRIGATION INJ NACL 0.9% 500ML BTL

## (undated) DEVICE — SOLUTION LACTATED RINGERS INJECTION USP

## (undated) DEVICE — APPLICATOR BNDG 1MM ADH PREMIERPRO EXOFIN

## (undated) DEVICE — STERILE POLYISOPRENE POWDER-FREE SURGICAL GLOVES WITH EMOLLIENT COATING: Brand: PROTEXIS

## (undated) DEVICE — DRAPE TWL SURG 16X26IN BLU ORB04] ALLCARE INC]

## (undated) DEVICE — VISUALIZATION SYSTEM: Brand: CLEARIFY

## (undated) DEVICE — SUT MONOCRYL PLUS UD 4-0 --

## (undated) DEVICE — STERILE POLYISOPRENE POWDER-FREE SURGICAL GLOVES: Brand: PROTEXIS

## (undated) DEVICE — 40595 XL TRENDELENBURG POSITIONING KIT: Brand: 40595 XL TRENDELENBURG POSITIONING KIT

## (undated) DEVICE — REM POLYHESIVE ADULT PATIENT RETURN ELECTRODE: Brand: VALLEYLAB

## (undated) DEVICE — APPLICATOR SURG XL L38CM FOR ARISTA ABSRB HEMSTAT FLEXITIP